# Patient Record
Sex: FEMALE | Race: WHITE | NOT HISPANIC OR LATINO | Employment: FULL TIME | URBAN - METROPOLITAN AREA
[De-identification: names, ages, dates, MRNs, and addresses within clinical notes are randomized per-mention and may not be internally consistent; named-entity substitution may affect disease eponyms.]

---

## 2017-03-06 ENCOUNTER — APPOINTMENT (OUTPATIENT)
Dept: RADIOLOGY | Facility: IMAGING CENTER | Age: 22
End: 2017-03-06
Attending: PHYSICIAN ASSISTANT
Payer: COMMERCIAL

## 2017-03-06 ENCOUNTER — OFFICE VISIT (OUTPATIENT)
Dept: URGENT CARE | Facility: PHYSICIAN GROUP | Age: 22
End: 2017-03-06
Payer: COMMERCIAL

## 2017-03-06 VITALS
TEMPERATURE: 102.9 F | SYSTOLIC BLOOD PRESSURE: 114 MMHG | RESPIRATION RATE: 15 BRPM | DIASTOLIC BLOOD PRESSURE: 70 MMHG | OXYGEN SATURATION: 94 % | HEART RATE: 113 BPM

## 2017-03-06 DIAGNOSIS — J18.9 LINGULAR PNEUMONIA: ICD-10-CM

## 2017-03-06 DIAGNOSIS — R50.9 FEVER, UNSPECIFIED FEVER CAUSE: ICD-10-CM

## 2017-03-06 LAB
FLUAV+FLUBV AG SPEC QL IA: NEGATIVE
INT CON NEG: NEGATIVE
INT CON POS: POSITIVE

## 2017-03-06 PROCEDURE — 99214 OFFICE O/P EST MOD 30 MIN: CPT | Performed by: PHYSICIAN ASSISTANT

## 2017-03-06 PROCEDURE — 87804 INFLUENZA ASSAY W/OPTIC: CPT | Performed by: PHYSICIAN ASSISTANT

## 2017-03-06 PROCEDURE — 71020 DX-CHEST-2 VIEWS: CPT | Mod: TC | Performed by: PHYSICIAN ASSISTANT

## 2017-03-06 RX ORDER — DOXYCYCLINE HYCLATE 100 MG
100 TABLET ORAL 2 TIMES DAILY
Qty: 20 TAB | Refills: 0 | Status: ON HOLD | OUTPATIENT
Start: 2017-03-06 | End: 2017-03-31

## 2017-03-06 RX ORDER — ACETAMINOPHEN 500 MG
1000 TABLET ORAL ONCE
OUTPATIENT
Start: 2017-03-06 | End: 2017-03-07

## 2017-03-06 RX ORDER — CODEINE PHOSPHATE AND GUAIFENESIN 10; 100 MG/5ML; MG/5ML
SOLUTION ORAL
Qty: 100 ML | Refills: 0 | Status: ON HOLD | OUTPATIENT
Start: 2017-03-06 | End: 2017-03-31

## 2017-03-06 NOTE — MR AVS SNAPSHOT
Dorcas Dave   3/6/2017 8:10 AM   Office Visit   MRN: [id number]    Department:  [facility]   Dept Phone:  [phone]    Description:  Female : 1995   Provider:  Kanchan Ragsdale PA-C           Reason for Visit     Cough x 4 days. Headache, one episode of vomiting.      Allergies as of 3/6/2017     Allergen Noted Reactions    Nkda [No Known Drug Allergy] 2008         You were diagnosed with     Lingular pneumonia   [[id number]]         Vital Signs     Blood Pressure Pulse Temperature Respirations Oxygen Saturation Last Menstrual Period    114/70 mmHg 113 39.4 °C (102.9 °F) 15 94% 2017    Breastfeeding? Smoking Status                No Passive Smoke Exposure - Never Smoker          Basic Information     Date Of Birth Sex Race Ethnicity Preferred Language    1995 Female White Non- English      Your appointments     Mar 15, 2017 12:45 PM   Scheduled Pre Admission with PREADMIT 1   [facility] TESTS [facility] (--)    [address] [place] [id number] [phone]              Health Maintenance        Date Due Completion Dates    IMM HEP B VACCINE (1 of 3 - Primary Series) 1995 ---    IMM HEP A VACCINE (1 of 2 - Standard Series) 1996 ---    IMM HPV VACCINE (1 of 3 - Female 3 Dose Series) 2006 ---    IMM VARICELLA (CHICKENPOX) VACCINE (1 of 2 - 2 Dose Adolescent Series) 2008 ---    IMM DTaP/Tdap/Td Vaccine (1 - Tdap) 2014 ---    PAP SMEAR 2016 ---    IMM INFLUENZA (1) 2016 ---            Results     POCT Influenza A/B      Component    Rapid Influenza A-B    Negative    Internal Control Positive    Positive    Internal Control Negative    Negative                        Current Immunizations     No immunizations on file.      Below and/or attached are the medications your provider expects you to take. Review all of your home medications and newly ordered medications with your provider and/or pharmacist. Follow medication instructions as  directed by your provider and/or pharmacist. Please keep your medication list with you and share with your provider. Update the information when medications are discontinued, doses are changed, or new medications (including over-the-counter products) are added; and carry medication information at all times in the event of emergency situations     Allergies:  NKDA - (reactions not documented)               Medications  Valid as of: March 06, 2017 - 11:00 AM    Generic Name Brand Name Tablet Size Instructions for use    Azithromycin (Tab) ZITHROMAX 250 MG 2 tabs by mouth day 1, 1 tab by mouth days 2-5        Doxycycline Hyclate (Tab) VIBRAMYCIN 100 MG Take 1 Tab by mouth 2 times a day.        Guaifenesin-Codeine (Solution) ROBITUSSIN -10 mg/5mL 1-2 teaspoons at bedtime prn cough. No driving.        Ibuprofen (Tab) MOTRIN 600 MG Take 1 Tab by mouth every 6 hours as needed for Mild Pain.        Phenylephrine-Pheniramine-DM   Take  by mouth.        .                 Medicines prescribed today were sent to:     [id number] - [place] - [address]    [address] [place] [place] [address]    Phone: [phone] Fax: [phone]    Open 24 Hours?: No      Medication refill instructions:       If your prescription bottle indicates you have medication refills left, it is not necessary to call your provider’s office. Please contact your pharmacy and they will refill your medication.    If your prescription bottle indicates you do not have any refills left, you may request refills at any time through one of the following ways: The online [employer] system (except Urgent Care), by calling your provider’s office, or by asking your pharmacy to contact your provider’s office with a refill request. Medication refills are processed only during regular business hours and may not be available until the next business day. Your provider may request additional information or to have a follow-up visit with you prior to refilling your  medication.   *Please Note: Medication refills are assigned a new Rx number when refilled electronically. Your pharmacy may indicate that no refills were authorized even though a new prescription for the same medication is available at the pharmacy. Please request the medicine by name with the pharmacy before contacting your provider for a refill.        Your To Do List     Future Labs/Procedures Complete By Expires    DX-CHEST-2 VIEWS  As directed 3/6/2018      Instructions    Pneumonia, Adult  Pneumonia is an infection of the lungs.   CAUSES  Pneumonia may be caused by bacteria or a virus. Usually, the infection is caused by breathing in droplets from an infected person's cough or sneeze.   SYMPTOMS   Symptoms of pneumonia include:  · Cough.  · Fever.  · Chest pain.  · Rapid breathing.  · Shortness of breath.  · Shaking chills.  · Mucus production.  DIAGNOSIS   If you have the common symptoms of pneumonia, often your health care provider will confirm the diagnosis with a chest X-ray. The X-ray will show an abnormality in the lung if you have pneumonia. Other tests may be done on your blood, urine, or mucus (sputum) to find the specific cause of your pneumonia. A blood gas test or pulse oximetry test may be needed to check how well your lungs are working.  TREATMENT   Your treatment will depend on whether your pneumonia is caused by bacteria or a virus.   · Bacterial pneumonia is treated with antibiotic medicine.  · Pneumonia that is caused by the influenza virus may be treated with an antiviral medicine.  · Pneumonia that is caused by a virus other than influenza will not respond to antibiotic medicine. This type of pneumonia will have to run its course.   HOME CARE INSTRUCTIONS   · Cough suppressants may be used if you are losing too much rest from coughing at night. However, you should try to avoid taking cough suppresants. This is because coughing helps to remove mucus from your lungs.  · Sleep in a  semi-upright position at night. Try sleeping in a reclining chair, or place a few pillows under your head.  · Try using a cold steam vaporizer or humidifier in your home or bedroom. This may help loosen your mucus.  · If you were prescribed an antibiotic medicine, finish all of it even if you start to feel better.  · If you were prescribed an expectorant, take it as directed by your health care provider. This medicine loosens the mucus so you can cough it up.  · Take medicines only as directed by your health care provider.  · Do not smoke. If you are a smoker and continue to smoke, your cough may last several weeks after your pneumonia has cleared.  · Get rest when you feel tired, or as needed.  PREVENTION  A pneumococcal shot (vaccine) is available to prevent a common bacterial cause of pneumonia. This is usually suggested for:  · People over 65 years old.  · People on chemotherapy.  · People with chronic lung problems, such as bronchitis or emphysema.  · People with immune system problems.  If you are over 65 years old or have a high risk condition, you may receive the pneumococcal vaccine if you have not received it before. In some countries, a routine influenza vaccine is also recommended. This vaccine can help prevent some cases of pneumonia. You may be offered the influenza vaccine as part of your care.  If you are a smoker, it is time to quit in order to prevent pneumonia in the future. You may receive instructions on how to stop smoking. Your health care provider can provide medicines and counseling to help you quit.  SEEK MEDICAL CARE IF:  · You have a fever.  · You cannot control your cough with suppressants at night, and you keep losing sleep.  SEEK IMMEDIATE MEDICAL CARE IF:   · You have worsening shortness of breath.  · You have increased chest pain.  · Your sickness becomes worse, especially if you are an older adult or have a weakened immune system.  · You cough up blood.  · You have pain that is  getting worse or is not controlled with medicines.  · Your symptoms are getting worse rather than better.     This information is not intended to replace advice given to you by your health care provider. Make sure you discuss any questions you have with your health care provider.     Document Released: 12/18/2006 Document Revised: 01/08/2016 Document Reviewed: 04/13/2016  [employer] Interactive Patient Education ©2016 [employer]            MyChart Status: Patient Declined

## 2017-03-06 NOTE — Clinical Note
March 6, 2017         Patient: Dorcas Dave   YOB: 1995   Date of Visit: 3/6/2017           To Whom it May Concern:    Dorcas Dave was seen in my clinic on 3/6/2017. I recommend she be off through Wednesday to recover.     If you have any questions or concerns, please don't hesitate to call.        Sincerely,           Kanchan Ragsdale PA-C  Electronically Signed

## 2017-03-06 NOTE — PATIENT INSTRUCTIONS
Pneumonia, Adult  Pneumonia is an infection of the lungs.   CAUSES  Pneumonia may be caused by bacteria or a virus. Usually, the infection is caused by breathing in droplets from an infected person's cough or sneeze.   SYMPTOMS   Symptoms of pneumonia include:  · Cough.  · Fever.  · Chest pain.  · Rapid breathing.  · Shortness of breath.  · Shaking chills.  · Mucus production.  DIAGNOSIS   If you have the common symptoms of pneumonia, often your health care provider will confirm the diagnosis with a chest X-ray. The X-ray will show an abnormality in the lung if you have pneumonia. Other tests may be done on your blood, urine, or mucus (sputum) to find the specific cause of your pneumonia. A blood gas test or pulse oximetry test may be needed to check how well your lungs are working.  TREATMENT   Your treatment will depend on whether your pneumonia is caused by bacteria or a virus.   · Bacterial pneumonia is treated with antibiotic medicine.  · Pneumonia that is caused by the influenza virus may be treated with an antiviral medicine.  · Pneumonia that is caused by a virus other than influenza will not respond to antibiotic medicine. This type of pneumonia will have to run its course.   HOME CARE INSTRUCTIONS   · Cough suppressants may be used if you are losing too much rest from coughing at night. However, you should try to avoid taking cough suppresants. This is because coughing helps to remove mucus from your lungs.  · Sleep in a semi-upright position at night. Try sleeping in a reclining chair, or place a few pillows under your head.  · Try using a cold steam vaporizer or humidifier in your home or bedroom. This may help loosen your mucus.  · If you were prescribed an antibiotic medicine, finish all of it even if you start to feel better.  · If you were prescribed an expectorant, take it as directed by your health care provider. This medicine loosens the mucus so you can cough it up.  · Take medicines only as  directed by your health care provider.  · Do not smoke. If you are a smoker and continue to smoke, your cough may last several weeks after your pneumonia has cleared.  · Get rest when you feel tired, or as needed.  PREVENTION  A pneumococcal shot (vaccine) is available to prevent a common bacterial cause of pneumonia. This is usually suggested for:  · People over 65 years old.  · People on chemotherapy.  · People with chronic lung problems, such as bronchitis or emphysema.  · People with immune system problems.  If you are over 65 years old or have a high risk condition, you may receive the pneumococcal vaccine if you have not received it before. In some countries, a routine influenza vaccine is also recommended. This vaccine can help prevent some cases of pneumonia. You may be offered the influenza vaccine as part of your care.  If you are a smoker, it is time to quit in order to prevent pneumonia in the future. You may receive instructions on how to stop smoking. Your health care provider can provide medicines and counseling to help you quit.  SEEK MEDICAL CARE IF:  · You have a fever.  · You cannot control your cough with suppressants at night, and you keep losing sleep.  SEEK IMMEDIATE MEDICAL CARE IF:   · You have worsening shortness of breath.  · You have increased chest pain.  · Your sickness becomes worse, especially if you are an older adult or have a weakened immune system.  · You cough up blood.  · You have pain that is getting worse or is not controlled with medicines.  · Your symptoms are getting worse rather than better.     This information is not intended to replace advice given to you by your health care provider. Make sure you discuss any questions you have with your health care provider.     Document Released: 12/18/2006 Document Revised: 01/08/2016 Document Reviewed: 04/13/2016  [employer] Interactive Patient Education ©2016 [employer]

## 2017-03-06 NOTE — PROGRESS NOTES
Chief Complaint   Patient presents with   • Cough     x 4 days. Headache, one episode of vomiting.       HISTORY OF PRESENT ILLNESS: Patient is a 22 y.o. female who presents today for 4 days of feeling unwell.   Symptoms started late Thursday, early Friday, a little over 72 hours ago. She states she started coughing, headache with coughing. She has started feeling lightheaded. She threw up last night x 1.     She has not been checking temp at home but has been feeling really hot.  She has been taking Tylenol and Theraflu, with minimal relief.     The cough is pretty dry and is sometimes keeping her up at night.   No chest pain.  Feeling SOB intermittently.  No hx of asthma or smoking.     There are no active problems to display for this patient.      Allergies:Nkda    Current Outpatient Prescriptions Ordered in [facility]   Medication Sig Dispense Refill   • Phenylephrine-Pheniramine-DM (THERAFLU COLD & COUGH PO) Take  by mouth.     • azithromycin (ZITHROMAX) 250 MG Tab 2 tabs by mouth day 1, 1 tab by mouth days 2-5 6 Tab 0   • ibuprofen (MOTRIN) 600 MG TABS Take 1 Tab by mouth every 6 hours as needed for Mild Pain. 20 Tab 0     No current Epic-ordered facility-administered medications on file.       Past Medical History   Diagnosis Date   • Ovarian cyst 1 month ago     left, [facility]   • IBS (irritable bowel syndrome)        Social History   Substance Use Topics   • Smoking status: Passive Smoke Exposure - Never Smoker   • Smokeless tobacco: Not on file      Comment: exposed to 1 parents smoke   • Alcohol Use: No       No family status information on file.   No family history on file.  No pertinent FH.        ROS:  Review of Systems   Constitutional: SEE HPI  HENT:SEE HPI  Eyes: Negative for blurred vision.   Respiratory: SEE HPI  Cardiovascular: Negative for chest pain, palpitations, orthopnea and leg swelling.   Gastrointestinal: Negative for heartburn, nausea, vomiting and abdominal pain.   All other systems reviewed and  are negative.       Exam:  Blood pressure 114/70, pulse 113, temperature 39.4 °C (102.9 °F), resp. rate 15, last menstrual period 02/27/2017, SpO2 94 %, not currently breastfeeding.  General:  Well nourished, well developed female in NAD, mildly unwell appearing.   Eyes: PERRLA, EOM within normal limits, no conjunctival injection, no scleral icterus, visual fields and acuity grossly intact.  Ears: Normal shape and symmetry, no tenderness, no discharge. External canals are without any significant edema or erythema. Tympanic membranes are without any inflammation, no effusion. Gross auditory acuity is intact  Nose: Symmetrical, sinuses without tenderness, no discharge.   Mouth: reasonable hygiene, no erythema exudates or tonsillar enlargement.  Neck: no masses, range of motion within normal limits, no tracheal deviation. No lymphadenopathy  Pulmonary: Normal respiratory effort, no wheezes, faint left sided crackles, no rhonchi.   Cardiovascular: regular rate and rhythm without murmurs, rubs, or gallops.  Skin: No visible rashes or lesion. Warm, pink, dry.   Extremities: no clubbing, cyanosis, or edema.  Neuro: A&O x 3. Speech normal/clear.  Normal gait.     RAD    Impression        Consolidative lingular pneumonia. Interval follow-up recommended.         Reading Provider Reading Date     Esvin Neely M.D. Mar 6, 2017         Assessment/Plan:  1. Lingular pneumonia  POCT Influenza A/B    acetaminophen (TYLENOL) tablet 1,000 mg    DX-CHEST-2 VIEWS    doxycycline (VIBRAMYCIN) 100 MG Tab    guaifenesin-codeine (CHERATUSSIN AC) Solution oral solution     -Lingular pneumonia as above.  O2 sats acceptable at 94%.       -no recent antibiotics    -fluids emphasized. Alternating Tylenol/Motrin prn pain/inflammation/fever  -Work note provided.     -codeine cough syrup as above.  Emphasized drowsy and no driving on this medicine.  Patient expressed understanding of this.   -RTC precautions discussed such as worsening despite abx,  worsening fevers, difficulty breathing, etc.   ER precautions discussed in detail.      Supportive care, differential diagnoses, and indications for immediate follow-up discussed with patient.   Pathogenesis of diagnosis discussed including typical length and natural progression.   Instructed to return to clinic or nearest emergency department for any change in condition, further concerns, or worsening of symptoms.  Patient states understanding of the plan of care and discharge instructions.  Instructed to make an appointment, for follow up, with their primary care provider.      Kanchan Ragsdale PA-C

## 2017-03-15 DIAGNOSIS — Z01.810 PRE-OPERATIVE CARDIOVASCULAR EXAMINATION: ICD-10-CM

## 2017-03-15 DIAGNOSIS — Z01.812 PRE-PROCEDURAL LABORATORY EXAMINATION: ICD-10-CM

## 2017-03-15 LAB
ANION GAP SERPL CALC-SCNC: 5 MMOL/L (ref 0–11.9)
BUN SERPL-MCNC: 13 MG/DL (ref 8–22)
CALCIUM SERPL-MCNC: 9.3 MG/DL (ref 8.5–10.5)
CHLORIDE SERPL-SCNC: 106 MMOL/L (ref 96–112)
CO2 SERPL-SCNC: 27 MMOL/L (ref 20–33)
CREAT SERPL-MCNC: 0.7 MG/DL (ref 0.5–1.4)
EKG IMPRESSION: NORMAL
ERYTHROCYTE [DISTWIDTH] IN BLOOD BY AUTOMATED COUNT: 42.4 FL (ref 35.9–50)
GFR SERPL CREATININE-BSD FRML MDRD: >60 ML/MIN/1.73 M 2
GLUCOSE SERPL-MCNC: 94 MG/DL (ref 65–99)
HCT VFR BLD AUTO: 39.8 % (ref 37–47)
HGB BLD-MCNC: 12.8 G/DL (ref 12–16)
MCH RBC QN AUTO: 27.3 PG (ref 27–33)
MCHC RBC AUTO-ENTMCNC: 32.2 G/DL (ref 33.6–35)
MCV RBC AUTO: 84.9 FL (ref 81.4–97.8)
PLATELET # BLD AUTO: 323 K/UL (ref 164–446)
PMV BLD AUTO: 9.1 FL (ref 9–12.9)
POTASSIUM SERPL-SCNC: 4.3 MMOL/L (ref 3.6–5.5)
RBC # BLD AUTO: 4.69 M/UL (ref 4.2–5.4)
SODIUM SERPL-SCNC: 138 MMOL/L (ref 135–145)
WBC # BLD AUTO: 6.7 K/UL (ref 4.8–10.8)

## 2017-03-15 PROCEDURE — 36415 COLL VENOUS BLD VENIPUNCTURE: CPT

## 2017-03-15 PROCEDURE — 85027 COMPLETE CBC AUTOMATED: CPT

## 2017-03-15 PROCEDURE — 80048 BASIC METABOLIC PNL TOTAL CA: CPT

## 2017-03-31 ENCOUNTER — HOSPITAL ENCOUNTER (OUTPATIENT)
Facility: MEDICAL CENTER | Age: 22
End: 2017-03-31
Attending: SURGERY | Admitting: SURGERY
Payer: COMMERCIAL

## 2017-03-31 VITALS
HEIGHT: 66 IN | SYSTOLIC BLOOD PRESSURE: 108 MMHG | BODY MASS INDEX: 43.33 KG/M2 | TEMPERATURE: 98.6 F | RESPIRATION RATE: 18 BRPM | WEIGHT: 269.62 LBS | OXYGEN SATURATION: 96 % | DIASTOLIC BLOOD PRESSURE: 60 MMHG | HEART RATE: 67 BPM

## 2017-03-31 PROBLEM — K82.8 OTHER SPECIFIED DISORDER OF GALLBLADDER: Status: ACTIVE | Noted: 2017-03-31

## 2017-03-31 LAB
HCG UR QL: NEGATIVE
SP GR UR REFRACTOMETRY: 1.03

## 2017-03-31 PROCEDURE — 88304 TISSUE EXAM BY PATHOLOGIST: CPT

## 2017-03-31 PROCEDURE — 160036 HCHG PACU - EA ADDL 30 MINS PHASE I: Performed by: SURGERY

## 2017-03-31 PROCEDURE — 700102 HCHG RX REV CODE 250 W/ 637 OVERRIDE(OP)

## 2017-03-31 PROCEDURE — 500697 HCHG HEMOCLIP, LARGE (ORANGE): Performed by: SURGERY

## 2017-03-31 PROCEDURE — 160035 HCHG PACU - 1ST 60 MINS PHASE I: Performed by: SURGERY

## 2017-03-31 PROCEDURE — 502240 HCHG MISC OR SUPPLY RC 0272: Performed by: SURGERY

## 2017-03-31 PROCEDURE — 110371 HCHG SHELL REV 272: Performed by: SURGERY

## 2017-03-31 PROCEDURE — 160048 HCHG OR STATISTICAL LEVEL 1-5: Performed by: SURGERY

## 2017-03-31 PROCEDURE — 160028 HCHG SURGERY MINUTES - 1ST 30 MINS LEVEL 3: Performed by: SURGERY

## 2017-03-31 PROCEDURE — 500868 HCHG NEEDLE, SURGI(VARES): Performed by: SURGERY

## 2017-03-31 PROCEDURE — 160009 HCHG ANES TIME/MIN: Performed by: SURGERY

## 2017-03-31 PROCEDURE — A9270 NON-COVERED ITEM OR SERVICE: HCPCS

## 2017-03-31 PROCEDURE — A4606 OXYGEN PROBE USED W OXIMETER: HCPCS | Performed by: SURGERY

## 2017-03-31 PROCEDURE — 501583 HCHG TROCAR, THRD CAN&SEAL 5X100: Performed by: SURGERY

## 2017-03-31 PROCEDURE — 700111 HCHG RX REV CODE 636 W/ 250 OVERRIDE (IP)

## 2017-03-31 PROCEDURE — 160046 HCHG PACU - 1ST 60 MINS PHASE II: Performed by: SURGERY

## 2017-03-31 PROCEDURE — 160002 HCHG RECOVERY MINUTES (STAT): Performed by: SURGERY

## 2017-03-31 PROCEDURE — 160025 RECOVERY II MINUTES (STATS): Performed by: SURGERY

## 2017-03-31 PROCEDURE — 501577 HCHG TROCAR, STEP 11MM: Performed by: SURGERY

## 2017-03-31 PROCEDURE — 501838 HCHG SUTURE GENERAL: Performed by: SURGERY

## 2017-03-31 PROCEDURE — 501570 HCHG TROCAR, SEPARATOR: Performed by: SURGERY

## 2017-03-31 PROCEDURE — 110382 HCHG SHELL REV 271: Performed by: SURGERY

## 2017-03-31 PROCEDURE — 160039 HCHG SURGERY MINUTES - EA ADDL 1 MIN LEVEL 3: Performed by: SURGERY

## 2017-03-31 PROCEDURE — 501399 HCHG SPECIMAN BAG, ENDO CATC: Performed by: SURGERY

## 2017-03-31 PROCEDURE — 81025 URINE PREGNANCY TEST: CPT

## 2017-03-31 PROCEDURE — 160047 HCHG PACU  - EA ADDL 30 MINS PHASE II: Performed by: SURGERY

## 2017-03-31 PROCEDURE — 700101 HCHG RX REV CODE 250

## 2017-03-31 RX ORDER — OXYCODONE HYDROCHLORIDE AND ACETAMINOPHEN 5; 325 MG/1; MG/1
1-2 TABLET ORAL EVERY 4 HOURS PRN
Status: DISCONTINUED | OUTPATIENT
Start: 2017-03-31 | End: 2017-03-31 | Stop reason: HOSPADM

## 2017-03-31 RX ORDER — KETOROLAC TROMETHAMINE 30 MG/ML
INJECTION, SOLUTION INTRAMUSCULAR; INTRAVENOUS
Status: COMPLETED
Start: 2017-03-31 | End: 2017-03-31

## 2017-03-31 RX ORDER — BUPIVACAINE HYDROCHLORIDE AND EPINEPHRINE 5; 5 MG/ML; UG/ML
INJECTION, SOLUTION EPIDURAL; INTRACAUDAL; PERINEURAL
Status: DISCONTINUED | OUTPATIENT
Start: 2017-03-31 | End: 2017-03-31 | Stop reason: HOSPADM

## 2017-03-31 RX ORDER — KETOROLAC TROMETHAMINE 30 MG/ML
30 INJECTION, SOLUTION INTRAMUSCULAR; INTRAVENOUS EVERY 6 HOURS
Status: DISCONTINUED | OUTPATIENT
Start: 2017-03-31 | End: 2017-03-31 | Stop reason: HOSPADM

## 2017-03-31 RX ORDER — ONDANSETRON 2 MG/ML
4 INJECTION INTRAMUSCULAR; INTRAVENOUS
Status: DISCONTINUED | OUTPATIENT
Start: 2017-03-31 | End: 2017-03-31 | Stop reason: HOSPADM

## 2017-03-31 RX ORDER — MORPHINE SULFATE 4 MG/ML
1-4 INJECTION, SOLUTION INTRAMUSCULAR; INTRAVENOUS
Status: DISCONTINUED | OUTPATIENT
Start: 2017-03-31 | End: 2017-03-31 | Stop reason: HOSPADM

## 2017-03-31 RX ORDER — OXYCODONE HYDROCHLORIDE AND ACETAMINOPHEN 5; 325 MG/1; MG/1
TABLET ORAL
Status: COMPLETED
Start: 2017-03-31 | End: 2017-03-31

## 2017-03-31 RX ORDER — MORPHINE SULFATE 4 MG/ML
INJECTION, SOLUTION INTRAMUSCULAR; INTRAVENOUS
Status: COMPLETED
Start: 2017-03-31 | End: 2017-03-31

## 2017-03-31 RX ORDER — MEPERIDINE HYDROCHLORIDE 25 MG/ML
INJECTION INTRAMUSCULAR; INTRAVENOUS; SUBCUTANEOUS
Status: COMPLETED
Start: 2017-03-31 | End: 2017-03-31

## 2017-03-31 RX ADMIN — KETOROLAC TROMETHAMINE 30 MG: 30 INJECTION, SOLUTION INTRAMUSCULAR; INTRAVENOUS at 10:07

## 2017-03-31 RX ADMIN — OXYCODONE AND ACETAMINOPHEN 2 TABLET: 5; 325 TABLET ORAL at 10:20

## 2017-03-31 RX ADMIN — FENTANYL CITRATE 50 MCG: 50 INJECTION, SOLUTION INTRAMUSCULAR; INTRAVENOUS at 09:53

## 2017-03-31 RX ADMIN — MEPERIDINE HYDROCHLORIDE 12.5 MG: 25 INJECTION INTRAMUSCULAR; INTRAVENOUS; SUBCUTANEOUS at 10:05

## 2017-03-31 RX ADMIN — FENTANYL CITRATE 50 MCG: 50 INJECTION, SOLUTION INTRAMUSCULAR; INTRAVENOUS at 10:14

## 2017-03-31 RX ADMIN — MEPERIDINE HYDROCHLORIDE 12.5 MG: 25 INJECTION INTRAMUSCULAR; INTRAVENOUS; SUBCUTANEOUS at 10:00

## 2017-03-31 ASSESSMENT — PAIN SCALES - GENERAL
PAINLEVEL_OUTOF10: 4
PAINLEVEL_OUTOF10: 5
PAINLEVEL_OUTOF10: 4
PAINLEVEL_OUTOF10: 0
PAINLEVEL_OUTOF10: 5
PAINLEVEL_OUTOF10: 5

## 2017-03-31 NOTE — OR SURGEON
Immediate Post-Operative Note      PreOp Diagnosis: cc    PostOp Diagnosis: smae    Procedure(s):  ELFEGO BY LAPAROSCOPY - Wound Class: Clean Contaminated    Surgeon(s):  NAINA Burton M.D.    Anesthesiologist/Type of Anesthesia:  Anesthesiologist: Bubba Solis M.D./General    Surgical Staff:  Circulator: Vijaya Juarez R.N.  Scrub Person: Cara Bruner; Fabrizio Baugh    Specimen: 1    Estimated Blood Loss: 5    Findings: same    Complications: 0        3/31/2017 9:40 AM Srinivasa Valentine

## 2017-03-31 NOTE — IP AVS SNAPSHOT
" Home Care Instructions                                                                                                                Name:Dorcas Dave  Medical Record Number:[id number]  CSN: [id number]    YOB: 1995   Age: 22 y.o.  Sex: female  HT:1.676 m (5' 6\") WT: 122.3 kg (269 lb 10 oz)          Admit Date: 3/31/2017     Discharge Date:   Today's Date: 3/31/2017  Attending Doctor:  Srinivasa Valentine M.D.                  Allergies:  Nkda                Discharge Instructions         ACTIVITY: Rest and take it easy for the first 24 hours.  A responsible adult is recommended to remain with you during that time.  It is normal to feel sleepy.  We encourage you to not do anything that requires balance, judgment or coordination.    MILD FLU-LIKE SYMPTOMS ARE NORMAL. YOU MAY EXPERIENCE GENERALIZED MUSCLE ACHES, THROAT IRRITATION, HEADACHE AND/OR SOME NAUSEA.    FOR 24 HOURS DO NOT:  Drive, operate machinery or run household appliances.  Drink beer or alcoholic beverages.   Make important decisions or sign legal documents.      SPECIAL INSTRUCTIONS:   Laparoscopic Cholecystectomy, Care After  Refer to this sheet in the next few weeks. These instructions provide you with information on caring for yourself after your procedure. Your health care provider may also give you more specific instructions. Your treatment has been planned according to current medical practices, but problems sometimes occur. Call your health care provider if you have any problems or questions after your procedure.  WHAT TO EXPECT AFTER THE PROCEDURE  After your procedure, it is typical to have the following:  · Pain at your incision sites. You will be given pain medicines to control the pain.  · Mild nausea or vomiting. This should improve after the first 24 hours.  · Bloating and possibly shoulder pain from the gas used during the procedure. This will improve after the first 24 hours.  HOME CARE INSTRUCTIONS   · Change bandages " (dressings) as directed by your health care provider.  · Keep the wound dry and clean. You may wash the wound gently with soap and water. Gently blot or dab the area dry.  · Do not take baths or use swimming pools or hot tubs for 2 weeks or until your health care provider approves.  · Only take over-the-counter or prescription medicines as directed by your health care provider.  · Continue your normal diet as directed by your health care provider.  · Do not lift anything heavier than 10 pounds (4.5 kg) until your health care provider approves.  · Do not play contact sports for 1 week or until your health care provider approves.  SEEK MEDICAL CARE IF:   · You have redness, swelling, or increasing pain in the wound.  · You notice yellowish-white fluid (pus) coming from the wound.  · You have drainage from the wound that lasts longer than 1 day.  · You notice a bad smell coming from the wound or dressing.  · Your surgical cuts (incisions) break open.  SEEK IMMEDIATE MEDICAL CARE IF:   · You develop a rash.  · You have difficulty breathing.  · You have chest pain.  · You have a fever.  · You have increasing pain in the shoulders (shoulder strap areas).  · You have dizzy episodes or faint while standing.  · You have severe abdominal pain.  · You feel sick to your stomach (nauseous) or throw up (vomit) and this lasts for more than 1 day.     This information is not intended to replace advice given to you by your health care provider. Make sure you discuss any questions you have with your health care provider.     Document Released: 12/18/2006 Document Revised: 10/08/2014 Document Reviewed: 07/30/2014  [employer] Interactive Patient Education ©2016 [employer]      DIET: To avoid nausea, slowly advance diet as tolerated, avoiding spicy or greasy foods for the first day.  Add more substantial food to your diet according to your physician's instructions.  Babies can be fed formula or breast milk as soon as they are hungry.   INCREASE FLUIDS AND FIBER TO AVOID CONSTIPATION.    SURGICAL DRESSING/BATHING: Call MD with any questions.     FOLLOW-UP APPOINTMENT:  A follow-up appointment should be arranged with your doctor in 1-2 weeks; call to schedule.    You should CALL YOUR PHYSICIAN if you develop:  Fever greater than 101 degrees F.  Pain not relieved by medication, or persistent nausea or vomiting.  Excessive bleeding (blood soaking through dressing) or unexpected drainage from the wound.  Extreme redness or swelling around the incision site, drainage of pus or foul smelling drainage.  Inability to urinate or empty your bladder within 8 hours.  Problems with breathing or chest pain.    You should call 911 if you develop problems with breathing or chest pain.  If you are unable to contact your doctor or surgical center, you should go to the nearest emergency room or urgent care center.  Physician's telephone #: [phone]    If any questions arise, call your doctor.  If your doctor is not available, please feel free to call the Surgical Center at [phone].  The Center is open Monday through Friday from 7AM to 7PM.  You can also call the [employer] HOTLINE open 24 hours/day, 7 days/week and speak to a nurse at [phone], or toll free at [phone].    A registered nurse may call you a few days after your surgery to see how you are doing after your procedure.    MEDICATIONS: Resume taking daily medication.  Take prescribed pain medication with food.  If no medication is prescribed, you may take non-aspirin pain medication if needed.  PAIN MEDICATION CAN BE VERY CONSTIPATING.  Take a stool softener or laxative such as senokot, pericolace, or milk of magnesia if needed.    Prescription given for Oxycodone.  Last pain medication given at 10:20 am.    If your physician has prescribed pain medication that includes Acetaminophen (Tylenol), do not take additional Acetaminophen (Tylenol) while taking the prescribed  medication.    Depression / Suicide Risk    As you are discharged from this [facility] facility, it is important to learn how to keep safe from harming yourself.    Recognize the warning signs:  · Abrupt changes in personality, positive or negative- including increase in energy   · Giving away possessions  · Change in eating patterns- significant weight changes-  positive or negative  · Change in sleeping patterns- unable to sleep or sleeping all the time   · Unwillingness or inability to communicate  · Depression  · Unusual sadness, discouragement and loneliness  · Talk of wanting to die  · Neglect of personal appearance   · Rebelliousness- reckless behavior  · Withdrawal from people/activities they love  · Confusion- inability to concentrate     If you or a loved one observes any of these behaviors or has concerns about self-harm, here's what you can do:  · Talk about it- your feelings and reasons for harming yourself  · Remove any means that you might use to hurt yourself (examples: pills, rope, extension cords, firearm)  · Get professional help from the community (Mental Health, Substance Abuse, psychological counseling)  · Do not be alone:Call your Safe Contact- someone whom you trust who will be there for you.  · Call your local CRISIS HOTLINE 978-8102 or 345-886-7459  · Call your local Children's Mobile Crisis Response Team [place] (042) 831-6318 or www.[employer] (MyBrandz)  · Call the toll free National Suicide Prevention Hotlines   · National Suicide Prevention Lifeline 198-918-GUUQ (5873)  · National Hope Line Network 800-SUICIDE (445-3396)       Medication List      Notice     You have not been prescribed any medications.            Medication Information     Above and/or attached are the medications your physician expects you to take upon discharge. Review all of your home medications and newly ordered medications with your doctor and/or pharmacist. Follow medication instructions as directed by your  doctor and/or pharmacist. Please keep your medication list with you and share with your physician. Update the information when medications are discontinued, doses are changed, or new medications (including over-the-counter products) are added; and carry medication information at all times in the event of emergency situations.        Resources     Quit Smoking / Tobacco Use:    I understand the use of any tobacco products increases my chance of suffering from future heart disease or stroke and could cause other illnesses which may shorten my life. Quitting the use of tobacco products is the single most important thing I can do to improve my health. For further information on smoking / tobacco cessation call a Toll Free Quit Line at 1-213.706.2149 (*National [employer] [place]) or 1-339.570.2753 (American Lung Association) or you can access the web based program at www.lungGoozzy.org.    [place] Tobacco Users Help Line:  (509) 596-2322       Toll Free: 1-948.686.2263  Quit Tobacco Program [facility] (408)478-6019    Crisis Hotline:    [place] Crisis Hotline:  3-064-BNQYJFS or 1-305.525.7332    [place] Crisis Hotline:    1-655.941.9197 or 373-233-3617    Discharge Survey:   Thank you for choosing [facility]. We hope we did everything we could to make your hospital stay a pleasant one. You may be receiving a survey and we would appreciate your time and participation in answering the questions. Your input is very valuable to us in our efforts to improve our service to our patients and their families.            Signatures     My signature on this form indicates that:    1. I acknowledge receipt and understanding of these Home Care Instruction.  2. My questions regarding this information have been answered to my satisfaction.  3. I have formulated a plan with my discharge nurse to obtain my prescribed medications for home.    __________________________________      __________________________________                    Patient Signature                                 Guardian/Responsible Adult Signature      __________________________________                 __________       ________                       Nurse Signature                                               Date                 Time

## 2017-03-31 NOTE — DISCHARGE INSTRUCTIONS
ACTIVITY: Rest and take it easy for the first 24 hours.  A responsible adult is recommended to remain with you during that time.  It is normal to feel sleepy.  We encourage you to not do anything that requires balance, judgment or coordination.    MILD FLU-LIKE SYMPTOMS ARE NORMAL. YOU MAY EXPERIENCE GENERALIZED MUSCLE ACHES, THROAT IRRITATION, HEADACHE AND/OR SOME NAUSEA.    FOR 24 HOURS DO NOT:  Drive, operate machinery or run household appliances.  Drink beer or alcoholic beverages.   Make important decisions or sign legal documents.      SPECIAL INSTRUCTIONS:   Laparoscopic Cholecystectomy, Care After  Refer to this sheet in the next few weeks. These instructions provide you with information on caring for yourself after your procedure. Your health care provider may also give you more specific instructions. Your treatment has been planned according to current medical practices, but problems sometimes occur. Call your health care provider if you have any problems or questions after your procedure.  WHAT TO EXPECT AFTER THE PROCEDURE  After your procedure, it is typical to have the following:  · Pain at your incision sites. You will be given pain medicines to control the pain.  · Mild nausea or vomiting. This should improve after the first 24 hours.  · Bloating and possibly shoulder pain from the gas used during the procedure. This will improve after the first 24 hours.  HOME CARE INSTRUCTIONS   · Change bandages (dressings) as directed by your health care provider.  · Keep the wound dry and clean. You may wash the wound gently with soap and water. Gently blot or dab the area dry.  · Do not take baths or use swimming pools or hot tubs for 2 weeks or until your health care provider approves.  · Only take over-the-counter or prescription medicines as directed by your health care provider.  · Continue your normal diet as directed by your health care provider.  · Do not lift anything heavier than 10 pounds (4.5 kg)  until your health care provider approves.  · Do not play contact sports for 1 week or until your health care provider approves.  SEEK MEDICAL CARE IF:   · You have redness, swelling, or increasing pain in the wound.  · You notice yellowish-white fluid (pus) coming from the wound.  · You have drainage from the wound that lasts longer than 1 day.  · You notice a bad smell coming from the wound or dressing.  · Your surgical cuts (incisions) break open.  SEEK IMMEDIATE MEDICAL CARE IF:   · You develop a rash.  · You have difficulty breathing.  · You have chest pain.  · You have a fever.  · You have increasing pain in the shoulders (shoulder strap areas).  · You have dizzy episodes or faint while standing.  · You have severe abdominal pain.  · You feel sick to your stomach (nauseous) or throw up (vomit) and this lasts for more than 1 day.     This information is not intended to replace advice given to you by your health care provider. Make sure you discuss any questions you have with your health care provider.     Document Released: 12/18/2006 Document Revised: 10/08/2014 Document Reviewed: 07/30/2014  [employer] Interactive Patient Education ©2016 [employer]      DIET: To avoid nausea, slowly advance diet as tolerated, avoiding spicy or greasy foods for the first day.  Add more substantial food to your diet according to your physician's instructions.  Babies can be fed formula or breast milk as soon as they are hungry.  INCREASE FLUIDS AND FIBER TO AVOID CONSTIPATION.    SURGICAL DRESSING/BATHING: Call MD with any questions.     FOLLOW-UP APPOINTMENT:  A follow-up appointment should be arranged with your doctor in 1-2 weeks; call to schedule.    You should CALL YOUR PHYSICIAN if you develop:  Fever greater than 101 degrees F.  Pain not relieved by medication, or persistent nausea or vomiting.  Excessive bleeding (blood soaking through dressing) or unexpected drainage from the wound.  Extreme redness or swelling around  the incision site, drainage of pus or foul smelling drainage.  Inability to urinate or empty your bladder within 8 hours.  Problems with breathing or chest pain.    You should call 911 if you develop problems with breathing or chest pain.  If you are unable to contact your doctor or surgical center, you should go to the nearest emergency room or urgent care center.  Physician's telephone #: [phone]    If any questions arise, call your doctor.  If your doctor is not available, please feel free to call the Surgical Center at [phone].  The Center is open Monday through Friday from 7AM to 7PM.  You can also call the HEALTH HOTLINE open 24 hours/day, 7 days/week and speak to a nurse at [phone], or toll free at [phone].    A registered nurse may call you a few days after your surgery to see how you are doing after your procedure.    MEDICATIONS: Resume taking daily medication.  Take prescribed pain medication with food.  If no medication is prescribed, you may take non-aspirin pain medication if needed.  PAIN MEDICATION CAN BE VERY CONSTIPATING.  Take a stool softener or laxative such as senokot, pericolace, or milk of magnesia if needed.    Prescription given for Oxycodone.  Last pain medication given at 10:20 am.    If your physician has prescribed pain medication that includes Acetaminophen (Tylenol), do not take additional Acetaminophen (Tylenol) while taking the prescribed medication.    Depression / Suicide Risk    As you are discharged from this [facility] facility, it is important to learn how to keep safe from harming yourself.    Recognize the warning signs:  · Abrupt changes in personality, positive or negative- including increase in energy   · Giving away possessions  · Change in eating patterns- significant weight changes-  positive or negative  · Change in sleeping patterns- unable to sleep or sleeping all the time   · Unwillingness or inability to  communicate  · Depression  · Unusual sadness, discouragement and loneliness  · Talk of wanting to die  · Neglect of personal appearance   · Rebelliousness- reckless behavior  · Withdrawal from people/activities they love  · Confusion- inability to concentrate     If you or a loved one observes any of these behaviors or has concerns about self-harm, here's what you can do:  · Talk about it- your feelings and reasons for harming yourself  · Remove any means that you might use to hurt yourself (examples: pills, rope, extension cords, firearm)  · Get professional help from the community (Mental Health, Substance Abuse, psychological counseling)  · Do not be alone:Call your Safe Contact- someone whom you trust who will be there for you.  · Call your local CRISIS HOTLINE 068-6521 or 448-840-1044  · Call your local Children's Mobile Crisis Response Team [place] (926) 129-3077 or www.[employer]  · Call the toll free National Suicide Prevention Hotlines   · National Suicide Prevention Lifeline 753-786-IVRW (6530)  · National Hope Line Network 800-SUICIDE (467-9370)

## 2017-03-31 NOTE — OP REPORT
DATE OF SERVICE:  03/31/2017    PREOPERATIVE DIAGNOSIS:  Chronic cholecystitis.    POSTOPERATIVE DIAGNOSIS:  Chronic cholecystitis.    OPERATION PERFORMED:  Laparoscopic cholecystectomy.    SURGEON:  Srinivasa Valentine MD.    ANESTHESIA:  Dr. Solis.    ASSISTANT:  Merrill Brink MD.    OPERATIVE NOTE:  The patient presents with signs and symptoms of biliary colic   and chronic cholecystitis.  She is felt to be a candidate for laparoscopic   cholecystectomy.  The risks and possible complications of operation were   carefully explained to her in detail.  She understood and accepted these risks   and wished to proceed.  She was taken to the operating room and placed under   anesthesia by Dr. Solis.  With her consent, her abdomen was prepped with   ChloraPrep solution.  Sterile drapes were applied.  Prophylactic antibiotics   had been administered and the patient had sequential stockings applied as   anti-embolism prophylaxis.  A time-out was affected.  A solution of 0.5%   Marcaine with epinephrine was liberally infiltrated in all wounds.  An   infraumbilical cutdown was made.  The fascia was elevated, was pierced with a   Veress needle.  Saline drop test was permissive to proceed with step   pneumoinsufflation.  Once pneumo insufflated, an 11 mm trocar was placed.  The   abdomen was surveyed.  She had a fatty liver.  There were no other   significant findings.  An 11 mm trocar was placed in the epigastrium and a   single 5 mm trocar was placed in the right upper quadrant.  The gallbladder   was grasped and elevated.  The adhesions were taken down using sharp   dissection electrocautery.  The triangle of Calot was dissected.  The cystic   duct was identified, triply clipped and divided and the cystic artery was   identified, doubly clipped and divided.  The gallbladder was taken down in   anterior grade fashion using countertraction electrocautery and delivered via   an Endosac.  The right upper quadrant was  copiously irrigated with sterile   saline.  Hemostasis was assured.  The patient upper abdominal trocars were   removed.  Their sites were hemostatic.  Pneumoperitoneum was collapsed.  The   fascia was closed using 0 Vicryl suture, the skin with running 4-0 Monocryl   subcuticular and Steri-Strips were applied with sterile dressings.  The   patient tolerated the procedure well.  There were no apparent complications   and is anticipated she will be treated on an ambulatory basis.  Estimated   blood loss was 5 mL.  The patient was given a prescription for oxycodone IR 5   mg #40 and Phenergan 25 mg #10, one refill.  She was asked to return to see me   in 1 week _____ that she has uncomplicated recovery.  If she has problems she   must call probably.  She was given careful postoperative care instructions.       ____________________________________     MD DAMEON BOLDEN / ONI    DD:  03/31/2017 09:43:37  DT:  03/31/2017 10:36:16    D#:  [id number]  Job#:  [id number]    cc: SHELTON LOPEZ MD, EBONIE GALE MD

## 2017-03-31 NOTE — IP AVS SNAPSHOT
3/31/2017          Dorcas Dave  [address] [place] [address]    Dear Dorcas:    [facility] wants to ensure your discharge home is safe and you or your loved ones have had all your questions answered regarding your care after you leave the hospital.    You may receive a telephone call within two days of your discharge.  This call is to make certain you understand your discharge instructions as well as ensure we provided you with the best care possible during your stay with us.     The call will only last approximately 3-5 minutes and will be done by a nurse.    Once again, we want to ensure your discharge home is safe and that you have a clear understanding of any next steps in your care.  If you have any questions or concerns, please do not hesitate to contact us, we are here for you.  Thank you for choosing [facility] for your healthcare needs.    Sincerely,    Ian Hernandez    [facility]

## 2017-11-13 ENCOUNTER — HOSPITAL ENCOUNTER (EMERGENCY)
Facility: MEDICAL CENTER | Age: 22
End: 2017-11-14
Attending: EMERGENCY MEDICINE
Payer: COMMERCIAL
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
## 2017-11-13 DIAGNOSIS — S09.90XA CLOSED HEAD INJURY, INITIAL ENCOUNTER: ICD-10-CM

## 2017-11-13 DIAGNOSIS — S00.83XA CONTUSION OF FACE, INITIAL ENCOUNTER: ICD-10-CM

## 2017-11-13 PROCEDURE — 99283 EMERGENCY DEPT VISIT LOW MDM: CPT

## 2017-11-14 VITALS
HEART RATE: 65 BPM | HEIGHT: 66 IN | BODY MASS INDEX: 43.69 KG/M2 | WEIGHT: 271.83 LBS | DIASTOLIC BLOOD PRESSURE: 69 MMHG | OXYGEN SATURATION: 99 % | TEMPERATURE: 97.7 F | RESPIRATION RATE: 18 BRPM | SYSTOLIC BLOOD PRESSURE: 130 MMHG

## 2017-11-14 ASSESSMENT — PAIN SCALES - GENERAL: PAINLEVEL_OUTOF10: 2

## 2017-11-14 NOTE — DISCHARGE INSTRUCTIONS
Facial or Scalp Contusion  A facial or scalp contusion is a deep bruise on the face or head. Injuries to the face and head generally cause a lot of swelling, especially around the eyes. Contusions are the result of an injury that caused bleeding under the skin. The contusion may turn blue, purple, or yellow. Minor injuries will give you a painless contusion, but more severe contusions may stay painful and swollen for a few weeks.   CAUSES   A facial or scalp contusion is caused by a blunt injury or trauma to the face or head area.   SIGNS AND SYMPTOMS   · Swelling of the injured area.    · Discoloration of the injured area.    · Tenderness, soreness, or pain in the injured area.    DIAGNOSIS   The diagnosis can be made by taking a medical history and doing a physical exam. An X-ray exam, CT scan, or MRI may be needed to determine if there are any associated injuries, such as broken bones (fractures).  TREATMENT   Often, the best treatment for a facial or scalp contusion is applying cold compresses to the injured area. Over-the-counter medicines may also be recommended for pain control.   HOME CARE INSTRUCTIONS   · Only take over-the-counter or prescription medicines as directed by your health care provider.    · Apply ice to the injured area.    ¨ Put ice in a plastic bag.    ¨ Place a towel between your skin and the bag.    ¨ Leave the ice on for 20 minutes, 2-3 times a day.    SEEK MEDICAL CARE IF:  · You have bite problems.    · You have pain with chewing.    · You are concerned about facial defects.  SEEK IMMEDIATE MEDICAL CARE IF:  · You have severe pain or a headache that is not relieved by medicine.    · You have unusual sleepiness, confusion, or personality changes.    · You throw up (vomit).    · You have a persistent nosebleed.    · You have double vision or blurred vision.    · You have fluid drainage from your nose or ear.    · You have difficulty walking or using your arms or legs.    MAKE SURE YOU:    · Understand these instructions.  · Will watch your condition.  · Will get help right away if you are not doing well or get worse.     This information is not intended to replace advice given to you by your health care provider. Make sure you discuss any questions you have with your health care provider.     Document Released: 01/25/2006 Document Revised: 01/08/2016 Document Reviewed: 07/31/2014  [employer] Interactive Patient Education ©2016 [employer]vier Inc[employer]      Head Injury, Adult  You have a head injury. Headaches and throwing up (vomiting) are common after a head injury. It should be easy to wake up from sleeping. Sometimes you must stay in the hospital. Most problems happen within the first 24 hours. Side effects may occur up to 7-10 days after the injury.   WHAT ARE THE TYPES OF HEAD INJURIES?  Head injuries can be as minor as a bump. Some head injuries can be more severe. More severe head injuries include:  · A jarring injury to the brain (concussion).  · A bruise of the brain (contusion). This mean there is bleeding in the brain that can cause swelling.  · A cracked skull (skull fracture).  · Bleeding in the brain that collects, clots, and forms a bump (hematoma).  WHEN SHOULD I GET HELP RIGHT AWAY?   · You are confused or sleepy.  · You cannot be woken up.  · You feel sick to your stomach (nauseous) or keep throwing up (vomiting).  · Your dizziness or unsteadiness is getting worse.  · You have very bad, lasting headaches that are not helped by medicine. Take medicines only as told by your doctor.  · You cannot use your arms or legs like normal.  · You cannot walk.  · You notice changes in the black spots in the center of the colored part of your eye (pupil).  · You have clear or bloody fluid coming from your nose or ears.  · You have trouble seeing.  During the next 24 hours after the injury, you must stay with someone who can watch you. This person should get help right away (call 911 in the [place] if you  start to shake and are not able to control it (have seizures), you pass out, or you are unable to wake up.  HOW CAN I PREVENT A HEAD INJURY IN THE FUTURE?  · Wear seat belts.  · Wear a helmet while bike riding and playing sports like football.  · Stay away from dangerous activities around the house.  WHEN CAN I RETURN TO NORMAL ACTIVITIES AND ATHLETICS?  See your doctor before doing these activities. You should not do normal activities or play contact sports until 1 week after the following symptoms have stopped:  · Headache that does not go away.  · Dizziness.  · Poor attention.  · Confusion.  · Memory problems.  · Sickness to your stomach or throwing up.  · Tiredness.  · Fussiness.  · Bothered by bright lights or loud noises.  · Anxiousness or depression.  · Restless sleep.  MAKE SURE YOU:   · Understand these instructions.  · Will watch your condition.  · Will get help right away if you are not doing well or get worse.     This information is not intended to replace advice given to you by your health care provider. Make sure you discuss any questions you have with your health care provider.     Document Released: 11/30/2009 Document Revised: 01/08/2016 Document Reviewed: 08/25/2014  [employer] Interactive Patient Education ©2016 Elsevier Inc.

## 2017-11-14 NOTE — ED PROVIDER NOTES
"ED Provider Note    CHIEF COMPLAINT  Chief Complaint   Patient presents with   • Head Injury     Pt states she was stocking water at work and the case broke and hit pt in head. Pt denies LOC.        HPI    Primary care provider: Merrill Cheatham M.D.   History obtained from:Patient  History limited by: None     Dorcas Dave is a 22 y.o. female who presents to the ED complaining of injury while working at [employer] around 9:00 tonight. Patient states that she was stocking water at work when the case broke and the water bottles fell on her head and her face. She reports headache and seeing white spots and feeling dizzy and wobbly but denies loss of consciousness. She denies any other injuries or pain anywhere else. She denies possibility of pregnancy. She denies nausea or vomiting. She denies any focal weakness. She denies any other neurological symptoms.    REVIEW OF SYSTEMS  Please see HPI for pertinent positives/negatives.  All other systems reviewed and are negative.     PAST MEDICAL HISTORY  Past Medical History:   Diagnosis Date   • Pneumonia 3/6/17    Not hospitalized \"mild case\".  Antibiotics and cough medicine given.   • Cold     March 9, 2017   • IBS (irritable bowel syndrome)    • Infectious disease    • Ovarian cyst 1 month ago    left, [facility]   • Pain     \"Stomach\"        SURGICAL HISTORY  Past Surgical History:   Procedure Laterality Date   • ELEFGO BY LAPAROSCOPY  3/31/2017    Procedure: ELFEGO BY LAPAROSCOPY;  Surgeon: Srinivasa Valentine M.D.;  Location: SURGERY [facility];  Service:    • GASTROSCOPY  4/11/08    Performed by ERIC ELY at SURGERY SAME DAY [facility]        SOCIAL HISTORY  Social History     Social History Main Topics   • Smoking status: Passive Smoke Exposure - Never Smoker   • Smokeless tobacco: Never Used      Comment: exposed to 1 parents smoke   • Alcohol use Yes      Comment: 3-5/week.   • Drug use: No   • Sexual activity: Not on file        FAMILY HISTORY  History " "reviewed. No pertinent family history.     CURRENT MEDICATIONS  Home Medications     Reviewed by Nahum Bello R.N. (Registered Nurse) on 11/13/17 at 2346  Med List Status: Complete   Medication Last Dose Status        Patient Timothy Taking any Medications                        ALLERGIES  Allergies   Allergen Reactions   • Nkda [No Known Drug Allergy]         PHYSICAL EXAM  VITAL SIGNS: /78   Pulse 64   Temp 36.5 °C (97.7 °F)   Resp 18   Ht 1.676 m (5' 6\")   Wt 123.3 kg (271 lb 13.2 oz)   SpO2 98%   BMI 43.87 kg/m²  @SHARIF[[id number]::@     Pulse ox interpretation:98% I interpret this pulse ox as normal     Constitutional: Well developed, well nourished, alert in no apparent distress, nontoxic appearance   HENT: Slight swelling with tenderness to the left forehead without crepitus/step-off, mild tenderness of the upper lip, normocephalic, bilateral external ears normal, no hemotympanum bilaterally, oropharynx moist and clear, airway patent, no loose teeth, nose non TTP with no hematoma/bleeding/drainage, midface stable, no malocclusion, no periorbital swelling/bruising, no mastoid swelling/bruising   Eyes: PERRL, EOMI, conjunctiva without erythema, no discharge, no icterus   Neck: Soft and supple, trachea midline, no stridor, no swelling/bruising, no midline C-spine tenderness, no stepoffs, no LAD, no JVD, good ROM   Cardiovascular: Regular rate and rhythm, no murmurs/rubs/gallops, strong distal pulses and good perfusion   Thorax & Lungs: No respiratory distress, CTAB with equal BS bilaterally, no chest tenderness  Abdomen: Soft, nontender, nondistended, no G/R, normal BS, pelvis stable   Back: Nontender to palpation  Extremities: No clubbing, no cyanosis, no edema, no gross deformity, good ROM at all joints, no tenderness, intact distal pulses with brisk cap refill   Skin: Warm, dry, no pallor/cyanosis, no rash noted   Lymphatic: No lymphadenopathy noted   Neuro: A/O times 3, GCS15, no focal deficits noted, " sensation intact to touch, equal strength bilateral UE/LE, normal gait  Psychiatric: Cooperative, normal mood and affect, normal judgement, appropriate for clinical situation          DIAGNOSTIC STUDIES / PROCEDURES        LABS  All labs reviewed by me.     Results for orders placed or performed during the hospital encounter of 03/31/17   HCG QUALITATIVE URINE (Pregnancy)   Result Value Ref Range    Beta-Hcg Urine Negative Negative   REFRACTOMETER SG   Result Value Ref Range    Specific Gravity 1.027         RADIOLOGY  The radiologist's interpretation of all radiological studies have been reviewed by me.     No orders to display          COURSE & MEDICAL DECISION MAKING  Nursing notes, VS, PMSFHx reviewed in chart.     Differential diagnoses considered include but are not limited to: Contusion, concussion/post-concussion syndrome, Fx, intracranial hemorrhage, abrasion/laceration, cervical strain/sprain       Patient presents to the ED with above complaint. This is an alert, pleasant well-appearing patient in no acute distress, nontoxic in appearance without any focal or worrisome neurological findings. She is otherwise healthy and is not on blood thinners. Discussed with patient pros/cons of head CT and she declined at this time which I think is reasonable. Discussed with patient monitoring for worrisome signs and symptoms. She was advised to follow-up with occupational health. She was given return to ED precautions. She verbalized understanding and agreed with plan of care with no further questions or concerns.      The patient is referred to a primary physician for blood pressure management, diabetic screening, and for all other preventative health concerns.       FINAL IMPRESSION  1. Closed head injury, initial encounter    2. Contusion of face, initial encounter    3. Work related injury           DISPOSITION  Patient will be discharged home in stable condition.       FOLLOW UP  [facility]  [address]  [place] [address]  [phone]    Call in 1 day      [facility], Emergency Dept  [address]  [place] [id number] [phone]    If symptoms worsen         OUTPATIENT MEDICATIONS  New Prescriptions    No medications on file          Electronically signed by: Burt Adrian, 11/13/2017 11:50 PM      Portions of this record were made with voice recognition software.  Despite my review, spelling/grammar/context errors may still remain.  Interpretation of this chart should be taken in this context.

## 2017-11-14 NOTE — ED NOTES
"Chief Complaint   Patient presents with   • Head Injury     Pt states she was stocking water at work and the case broke and hit pt in head. Pt denies LOC.     /78   Pulse 64   Temp 36.5 °C (97.7 °F)   Resp 18   Ht 1.676 m (5' 6\")   Wt 123.3 kg (271 lb 13.2 oz)   SpO2 98%   BMI 43.87 kg/m²     "

## 2017-11-21 ENCOUNTER — OFFICE VISIT (OUTPATIENT)
Dept: URGENT CARE | Facility: PHYSICIAN GROUP | Age: 22
End: 2017-11-21
Payer: COMMERCIAL

## 2017-11-21 VITALS
TEMPERATURE: 98.5 F | HEIGHT: 64 IN | OXYGEN SATURATION: 95 % | WEIGHT: 259 LBS | RESPIRATION RATE: 18 BRPM | DIASTOLIC BLOOD PRESSURE: 62 MMHG | HEART RATE: 102 BPM | BODY MASS INDEX: 44.22 KG/M2 | SYSTOLIC BLOOD PRESSURE: 106 MMHG

## 2017-11-21 DIAGNOSIS — R11.2 NAUSEA AND VOMITING, INTRACTABILITY OF VOMITING NOT SPECIFIED, UNSPECIFIED VOMITING TYPE: ICD-10-CM

## 2017-11-21 DIAGNOSIS — K52.9 GASTROENTERITIS: Primary | ICD-10-CM

## 2017-11-21 LAB
APPEARANCE UR: NORMAL
BILIRUB UR STRIP-MCNC: NORMAL MG/DL
COLOR UR AUTO: NORMAL
GLUCOSE UR STRIP.AUTO-MCNC: NORMAL MG/DL
KETONES UR STRIP.AUTO-MCNC: NORMAL MG/DL
LEUKOCYTE ESTERASE UR QL STRIP.AUTO: NORMAL
NITRITE UR QL STRIP.AUTO: NORMAL
PH UR STRIP.AUTO: 6 [PH] (ref 5–8)
PROT UR QL STRIP: 100 MG/DL
RBC UR QL AUTO: NORMAL
SP GR UR STRIP.AUTO: 1.03
UROBILINOGEN UR STRIP-MCNC: NORMAL MG/DL

## 2017-11-21 PROCEDURE — 99214 OFFICE O/P EST MOD 30 MIN: CPT | Performed by: PHYSICIAN ASSISTANT

## 2017-11-21 PROCEDURE — 81002 URINALYSIS NONAUTO W/O SCOPE: CPT | Performed by: PHYSICIAN ASSISTANT

## 2017-11-21 RX ORDER — ONDANSETRON 4 MG/1
4 TABLET, ORALLY DISINTEGRATING ORAL EVERY 8 HOURS PRN
Qty: 10 TAB | Refills: 0 | Status: SHIPPED | OUTPATIENT
Start: 2017-11-21 | End: 2020-03-16

## 2017-11-21 RX ORDER — DIPHENOXYLATE HYDROCHLORIDE AND ATROPINE SULFATE 2.5; .025 MG/1; MG/1
1 TABLET ORAL 4 TIMES DAILY PRN
Qty: 30 TAB | Refills: 0 | Status: SHIPPED | OUTPATIENT
Start: 2017-11-21 | End: 2020-03-16

## 2017-11-21 RX ORDER — ONDANSETRON 4 MG/1
4 TABLET, ORALLY DISINTEGRATING ORAL ONCE
Status: COMPLETED | OUTPATIENT
Start: 2017-11-21 | End: 2017-11-21

## 2017-11-21 RX ADMIN — ONDANSETRON 4 MG: 4 TABLET, ORALLY DISINTEGRATING ORAL at 11:26

## 2017-11-21 ASSESSMENT — ENCOUNTER SYMPTOMS
HEADACHES: 1
CARDIOVASCULAR NEGATIVE: 1
DIARRHEA: 1
VOMITING: 1
ABDOMINAL PAIN: 1
EYES NEGATIVE: 1
MUSCULOSKELETAL NEGATIVE: 1
RESPIRATORY NEGATIVE: 1
PSYCHIATRIC NEGATIVE: 1
NAUSEA: 1

## 2017-11-21 NOTE — PROGRESS NOTES
"Subjective:      Dorcas Dave is a 22 y.o. female who presents with Emesis (x 1 day ) and Diarrhea (x 1 day )            HPI  Chief Complaint   Patient presents with   • Emesis     x 1 day    • Diarrhea     x 1 day        HPI:  Dorcas Dave is a 22 y.o. female who presents with diarrhea and vomiting since yesterday.  Couldn't get out of bed.  Right sided stomach pain with radiation all the way around.  Hx cholecystectomy in March.  Did try pepto, caused vomiting.  Did try tums darrian with n/v.  Diarrhea also started yesterday.  Every hour, 6+ times a day.  Can't keep water down.  No other sick contacts.  Some hot flashes.  Did not get flu vaccine.    Patient denies HA, SOB, chest pain, palpitations, fever, chills.      Past Medical History:   Diagnosis Date   • Cold     March 9, 2017   • IBS (irritable bowel syndrome)    • Infectious disease    • Ovarian cyst 1 month ago    left, [facility]   • Pain     \"Stomach\"   • Pneumonia 3/6/17    Not hospitalized \"mild case\".  Antibiotics and cough medicine given.       Past Surgical History:   Procedure Laterality Date   • ELFEGO BY LAPAROSCOPY  3/31/2017    Procedure: ELFEGO BY LAPAROSCOPY;  Surgeon: Srinivasa Valentine M.D.;  Location: SURGERY [facility];  Service:    • GASTROSCOPY  4/11/08    Performed by ERIC ELY at SURGERY SAME DAY [facility]       History reviewed. No pertinent family history.  No pertinent family history.    Social History     Social History   • Marital status: Single     Spouse name: N/A   • Number of children: N/A   • Years of education: N/A     Occupational History   • Not on file.     Social History Main Topics   • Smoking status: Passive Smoke Exposure - Never Smoker   • Smokeless tobacco: Never Used      Comment: exposed to 1 parents smoke   • Alcohol use Yes      Comment: 3-5/week.   • Drug use: No   • Sexual activity: Not on file     Other Topics Concern   • Not on file     Social History Narrative   • No narrative on file " "      No current outpatient prescriptions on file.    Allergies   Allergen Reactions   • Nkda [No Known Drug Allergy]         Review of Systems   Constitutional: Positive for malaise/fatigue.   HENT: Negative.    Eyes: Negative.    Respiratory: Negative.    Cardiovascular: Negative.    Gastrointestinal: Positive for abdominal pain, diarrhea, nausea and vomiting.   Genitourinary: Negative.    Musculoskeletal: Negative.    Skin: Negative.    Neurological: Positive for headaches.   Endo/Heme/Allergies: Negative.    Psychiatric/Behavioral: Negative.           Objective:     /62   Pulse (!) 102   Temp 36.9 °C (98.5 °F)   Resp 18   Ht 1.626 m (5' 4\")   Wt 117.5 kg (259 lb)   SpO2 95%   BMI 44.46 kg/m²      Physical Exam       Nursing note and vitals reviewed.    Constitutional:  Appropriately groomed, pleasant affect, and in no acute distress.    Head: normocephalic and atraumatic.    Eye:   PERRLA, EOM's full, sclera white, no scleral icterus, conjunctiva not erythematous, and medial canthus without exudate bilaterally.    Ears:  Hearing grossly intact to voice.    Throat:  Oropharynx not erythematous, with no enlargement of the palatine tonsils bilaterally with no exudates.    Posterior oropharynx with no post nasal drainage present.  Soft palate rises symmetrically bilaterally and uvula midline.  Neck supple, with mild proximal anterior cervical chain lymphadenopathy that is soft and mobile to palpation.  Thyroid non-palpable.  No supraclavicular lymphadenopathy.    Lungs:  Lungs with normal respiratory excursion and effort.    Heart:  RRR, without murmurs, rubs, or gallops.  Carotid arteries without bruits bilaterally.  Radial and dorsalis pedis pulses 2+ bilaterally    Abdomen:  Protuberant without striations, ecchymosis, or lesions.  Surgical scars present consistent with prior history of cholecystectomy. Bowel sounds present all 4Qs, hyperactive. Generalized TTP.  No masses to palpation.  No " hepatosplenomegaly, no TTP at McBurney's point, negative Limon's sign, no rebound tenderness, and no guarding.  No CVA tenderness bilaterally.    MSK:  Gait and station wnl, non antalgic.    Derm:  No rashes or lesions with good turgor pressure.     Psychiatric:  Normal judgement, mood and affect.      Assessment/Plan:     1. Gastroenteritis  ondansetron (ZOFRAN ODT) 4 MG TABLET DISPERSIBLE    diphenoxylate-atropine (LOMOTIL) 2.5-0.025 MG Tab   2. Nausea and vomiting, intractability of vomiting not specified, unspecified vomiting type  ondansetron (ZOFRAN ODT) dispertab 4 mg    POCT Urinalysis    ondansetron (ZOFRAN ODT) 4 MG TABLET DISPERSIBLE    diphenoxylate-atropine (LOMOTIL) 2.5-0.025 MG Tab     Patient presents with nausea and vomiting and fatigue and hot flashes since yesterday. Did try Pepto and Tums with no improvement. Unable to keep fluids down. On exam patient is afebrile and sitting comfortable in our exam room table. Post-Zofran is able to tolerate water. Urine analysis unremarkable. Suspect viral etiology and recommended Zofran and Imodium. Bowel reset diet, progress diet as tolerated.    Patient was in agreement with this treatment plan and seemed to understand without barriers. All questions were encouraged and answered.  Reviewed signs and symptoms of when to seek emergency medical care.     Please note that this dictation was created using voice recognition software.  I have made every reasonable attempt to correct obvious errors, but I expect there are errors of jamie and possibly content that I did not discover before finalizing the note.

## 2017-11-21 NOTE — LETTER
November 21, 2017         Patient: Dorcas Dave   YOB: 1995   Date of Visit: 11/21/2017           To Whom it May Concern:    Dorcas Dave was seen in my clinic on 11/21/2017. Please excuse her from work 11/22.    If you have any questions or concerns, please don't hesitate to call.        Sincerely,           Enzo Black P.A.-C.  Electronically Signed

## 2018-11-28 ENCOUNTER — OCCUPATIONAL MEDICINE (OUTPATIENT)
Dept: URGENT CARE | Facility: CLINIC | Age: 23
End: 2018-11-28
Payer: COMMERCIAL

## 2018-11-28 ENCOUNTER — APPOINTMENT (OUTPATIENT)
Dept: RADIOLOGY | Facility: IMAGING CENTER | Age: 23
End: 2018-11-28
Attending: NURSE PRACTITIONER
Payer: COMMERCIAL

## 2018-11-28 VITALS
TEMPERATURE: 98 F | OXYGEN SATURATION: 95 % | SYSTOLIC BLOOD PRESSURE: 110 MMHG | RESPIRATION RATE: 18 BRPM | HEART RATE: 88 BPM | DIASTOLIC BLOOD PRESSURE: 72 MMHG | WEIGHT: 246 LBS | HEIGHT: 64 IN | BODY MASS INDEX: 42 KG/M2

## 2018-11-28 DIAGNOSIS — S60.222A CONTUSION OF LEFT HAND, INITIAL ENCOUNTER: ICD-10-CM

## 2018-11-28 PROCEDURE — 73130 X-RAY EXAM OF HAND: CPT | Mod: 26,LT,29 | Performed by: NURSE PRACTITIONER

## 2018-11-28 PROCEDURE — 99213 OFFICE O/P EST LOW 20 MIN: CPT | Mod: 29 | Performed by: NURSE PRACTITIONER

## 2018-11-28 NOTE — PROGRESS NOTES
"Subjective:      Dorcas Dave is a 23 y.o. female who presents with Hand Injury ([facility], PT states she injured her hand while moving a pallot, concerned about possible break. )      DOI: 11/27/18   Patient was moving a pallet off of the line and onto a table; the table was uneven and the pallet started to fall.  The bar from the pallet crushed the patient's left 5th finger. She was attended to by a paramedic at the scene who immobilized the finger but did not see deformity. Patient states today she continues to have pain and stiffness in the finger with painful ROM at the PIP and MCP joint. No other injuries.      Hand Injury   This is a new problem. The current episode started yesterday (11/27/18). The problem occurs constantly. The problem has been unchanged. Associated symptoms comments: Pain and stiffness in the left 5th finger and in the mcp joint. Exacerbated by: movement. She has tried immobilization and rest for the symptoms. The treatment provided no relief.       Review of Systems   Musculoskeletal:        Pain and stiffness in the left 5th finger.   All other systems reviewed and are negative.         Objective:     /72 (BP Location: Left arm, Patient Position: Sitting, BP Cuff Size: Adult)   Pulse 88   Temp 36.7 °C (98 °F) (Temporal)   Resp 18   Ht 1.626 m (5' 4\")   Wt 111.6 kg (246 lb)   SpO2 95%   BMI 42.23 kg/m²      Physical Exam   Constitutional: She appears well-developed and well-nourished.   Musculoskeletal:        Hands:  Skin: Capillary refill takes less than 2 seconds.   Psychiatric: She has a normal mood and affect. Her behavior is normal. Judgment and thought content normal.   Vitals reviewed.      Mild STS over the base of the 5th finger and the dorsal ulnar aspect of the left hand. Painful ROM at the PIP and MCP joint. Slight discoloration over the digit.  No obvious deformity.        XR left hand:       11/28/2018 9:25 AM    HISTORY/REASON FOR EXAM:  Left hand pain, " worse around fifth digit after dropping pallet on hand.      TECHNIQUE/EXAM DESCRIPTION AND NUMBER OF VIEWS:  3 views of the LEFT hand.    COMPARISON: None    FINDINGS:  Bone mineralization is age appropriate. Bony alignment is anatomic. There is no evidence of acute fracture or dislocation.   Impression       No radiographic evidence of acute traumatic injury.       Assessment/Plan:     1. Contusion of left hand, initial encounter    - DX-HAND 3+ LEFT; Future  -immobilize  -ice  -ibuprofen  -see D39 for restrictions  -Return on 12/3/18 for follow up.

## 2018-11-28 NOTE — LETTER
"EMPLOYEE’S CLAIM FOR COMPENSATION/ REPORT OF INITIAL TREATMENT  FORM C-4    EMPLOYEE’S CLAIM - PROVIDE ALL INFORMATION REQUESTED   First Name  Dorcas Last Name  Tenzin Birthdate                    1995                Sex  female Claim Number   Home Address  [address] Age  23 y.o. Height  1.626 m (5' 4\") Weight  111.6 kg (246 lb) [facility]     [place] Zip  [address] Telephone  [phone] (home)    Mailing Address  [address] [place]  [address] Primary Language Spoken  English    Insurer  [employer] Third Party   [employer]   Employee's Occupation (Job Title) When Injury or Occupational Disease Occurred      Employer's Name  [employer]  Telephone  [phone]    Employer Address  [address]  [facility]  Zip  [address]    Date of Injury  11/27/2018               Hour of Injury  11:30 PM Date Employer Notified  11/27/2018 Last Day of Work after Injury or Occupational Disease  11/28/2018 Supervisor to Whom Injury Reported  Parker Duran   Address or Location of Accident (if applicable)  [[employer]]   What were you doing at the time of accident? (if applicable)  Moving pallets off the line    How did this injury or occupational disease occur? (Be specific an answer in detail. Use additional sheet if necessary)  The pallet hit into hi-boy and I tried to keep pallet from hitting floor. I ended up smashing my hand   If you believe that you have an occupational disease, when did you first have knowledge of the disability and it relationship to your employment?  n/a Witnesses to the Accident  Lili Caldwell Gabby, Howard, Peter      Nature of Injury or Occupational Disease  Workers' Compensation  Part(s) of Body Injured or Affected  Finger (L), Hand (L),     I certify that the above is true and correct to the best of my knowledge and that I have " provided this information in order to obtain the benefits of [place]  I hereby authorize any physician, chiropractor, surgeon, practitioner, or other person, any hospital, including [facility] or [facility] hospital, any medical service organization, any insurance company, or other institution or organization to release to each other, any medical or other information, including benefits paid or payable, pertinent to this injury or disease, except information relative to diagnosis, treatment and/or counseling for AIDS, psychological conditions, alcohol or controlled substances, for which I must give specific authorization.  A Photostat of this authorization shall be as valid as the original.     Date 11/28/18   [facility]   Employee’s Signature   THIS REPORT MUST BE COMPLETED AND MAILED WITHIN 3 WORKING DAYS OF TREATMENT   Place  [facility]  Name of Facility  [facility]   Date  11/28/2018 Diagnosis  (S60.222A) Contusion of left hand, initial encounter Is there evidence the injured employee was under the influence of alcohol and/or another controlled substance at the time of accident?   Hour  9:04 AM Description of Injury or Disease  The encounter diagnosis was Contusion of left hand, initial encounter. No   Treatment  Immobilization, ice, ibuprofen ,rest  Have you advised the patient to remain off work five days or more? No   X-Ray Findings  Negative   If Yes   From Date  To Date      From information given by the employee, together with medical evidence, can you directly connect this injury or occupational disease as job incurred?  Yes If No Full Duty  No Modified Duty  Yes   Is additional medical care by a physician indicated?  Yes If Modified Duty, Specify any Limitations / Restrictions  Limited use of the left hand   Do you know of any previous injury or disease  "contributing to this condition or occupational disease?                            No   Date  11/28/2018 Print Doctor’s Name Cathey J Hamman, A.P.N. I certify the employer’s copy of  this form was mailed on:   Address  [address] [facility] [address] Insurer’s Use Only     [place] [facility] Zip  [address]    Provider’s Tax ID Number  [id number] Telephone  Dept: [phone]        antonio-SignHAMMAN, CATHEY J A.P.N.   e-Signature: Dr. Shiva Tarango, Medical Director Degree  APN        ORIGINAL-TREATING PHYSICIAN OR CHIROPRACTOR    PAGE 2-INSURER/TPA    PAGE 3-EMPLOYER    PAGE 4-EMPLOYEE             Form C-4 (rev10/07)              BRIEF DESCRIPTION OF RIGHTS AND BENEFITS  (Pursuant to [place]RS 616C.050)    Notice of Injury or Occupational Disease (Incident Report Form C-1): If an injury or occupational disease (OD) arises out of and in the  course of employment, you must provide written notice to your employer as soon as practicable, but no later than 7 days after the accident or  OD. Your employer shall maintain a sufficient supply of the required forms.    Claim for Compensation (Form C-4): If medical treatment is sought, the form C-4 is available at the place of initial treatment. A completed  \"Claim for Compensation\" (Form C-4) must be filed within 90 days after an accident or OD. The treating physician or chiropractor must,  within 3 working days after treatment, complete and mail to the employer, the employer's insurer and third-party , the Claim for  Compensation.    Medical Treatment: If you require medical treatment for your on-the-job injury or OD, you may be required to select a physician or  chiropractor from a list provided by your workers’ compensation insurer, if it has contracted with an Organization for Managed Care (MCO) or  Preferred Provider Organization (PPO) or providers of health care. If your employer has not entered into a contract with an MCO or PPO, you  may select a physician or " chiropractor from the Panel of Physicians and Chiropractors. Any medical costs related to your industrial injury or  OD will be paid by your insurer.    Temporary Total Disability (TTD): If your doctor has certified that you are unable to work for a period of at least 5 consecutive days, or 5  cumulative days in a 20-day period, or places restrictions on you that your employer does not accommodate, you may be entitled to TTD  compensation.    Temporary Partial Disability (TPD): If the wage you receive upon reemployment is less than the compensation for TTD to which you are  entitled, the insurer may be required to pay you TPD compensation to make up the difference. TPD can only be paid for a maximum of 24  months.    Permanent Partial Disability (PPD): When your medical condition is stable and there is an indication of a PPD as a result of your injury or  OD, within 30 days, your insurer must arrange for an evaluation by a rating physician or chiropractor to determine the degree of your PPD. The  amount of your PPD award depends on the date of injury, the results of the PPD evaluation and your age and wage.    Permanent Total Disability (PTD): If you are medically certified by a treating physician or chiropractor as permanently and totally disabled  and have been granted a PTD status by your insurer, you are entitled to receive monthly benefits not to exceed 66 2/3% of your average  monthly wage. The amount of your PTD payments is subject to reduction if you previously received a PPD award.    Vocational Rehabilitation Services: You may be eligible for vocational rehabilitation services if you are unable to return to the job due to a  permanent physical impairment or permanent restrictions as a result of your injury or occupational disease.    Transportation and Per Sunny Reimbursement: You may be eligible for travel expenses and per sunny associated with medical treatment.    Reopening: You may be able to reopen your  claim if your condition worsens after claim closure.    Appeal Process: If you disagree with a written determination issued by the insurer or the insurer does not respond to your request, you may  appeal to the Department of Administration, , by following the instructions contained in your determination letter. You must  appeal the determination within 70 days from the date of the determination letter at [address], [address], [place], [place]  [address], or [address], [place], [place] [address]. If you disagree with the  decision, you may appeal to the  Department of Administration, . You must file your appeal within 30 days from the date of the  decision  letter at [address], [address], [place], [place] [address], or [address], [place], [place] [address]. If you  disagree with a decision of an , you may file a petition for judicial review with the District Court. You must do so within 30  days of the Appeal Officer’s decision. You may be represented by an  at your own expense or you may contact the [facility] for possible  representation.    [place]  for Injured Workers (NAIW): If you disagree with a  decision, you may request that NAIW represent you  without charge at an  Hearing. For information regarding denial of benefits, you may contact the [facility] at: [address], [address], [place] [address], [phone], or [address], [place] [address], [phone]    To File a Complaint with the Division: If you wish to file a complaint with the  of the Division of Industrial Relations (DIR),  please contact the Workers’ Compensation Section, [address], [address], [place], [place] [address], telephone ([phone]) [phone], or  [address], [place]  [address], telephone [phone].    For assistance with Workers’ Compensation Issues: you may contact the Office of the Governor Consumer Health Assistance, [address] [place] [address], [address], [place] [address], Toll Free 1-346.200.9571, Web site: [email or web address].[email or web address]., E-mail  [email or web address].                                                                                                                                                                                                                                   __________________________________________________________________                                                                   _____11/28/18____________                Employee Name / Signature                                                                                                                                                       Date                                                                                                                                                                                                     D-2 (rev. 10/07)

## 2018-11-28 NOTE — LETTER
[facility]  [address] [address] [place] [address]  Phone:  [phone] - Fax:  [phone]   [facility] Progress Report and Disability Certification  Date of Service: 11/28/2018   No Show:  No  Date / Time of Next Visit: 12/3/2018   Claim Information   Patient Name: Dorcas Dave  Claim Number:     Employer: [employer]  Date of Injury: 11/27/2018     Insurer / TPA: [employer]  ID / SSN:     Occupation:   Diagnosis: The encounter diagnosis was Contusion of left hand, initial encounter.    Medical Information   Related to Industrial Injury? Yes    Subjective Complaints:  DOI: 11/27/18   Patient was moving a pallet off of the line and onto a table; the table was uneven and the pallet started to fall.  The bar from the pallet crushed the patient's left 5th finger. She was attended to by a paramedic at the scene who immobilized the finger but did not see deformity. Patient states today she continues to have pain and stiffness in the finger with painful ROM at the PIP and MCP joint. No other injuries.    Objective Findings: Mild STS over the base of the 5th finger and the dorsal ulnar aspect of the left hand. Painful ROM at the PIP and MCP joint. Slight discoloration over the digit.  No obvious deformity.    Pre-Existing Condition(s):     Assessment:   Initial Visit    Status: Additional Care Required  Permanent Disability:No    Plan: Medication  Comments:ibuprofen    Diagnostics: X-ray negative    Comments:       Disability Information   Status: Released to Restricted Duty    From:  11/28/2018  Through: 12/3/2018 Restrictions are: Temporary   Physical Restrictions   Sitting:    Standing:    Stooping:    Bending:      Squatting:    Walking:    Climbing:    Pushing:  < or = to 1 hr/day   Pulling:  < or = to 1 hr/day Other:    Reaching Above Shoulder (L):   Reaching Above Shoulder (R):       Reaching Below Shoulder (L):    Reaching  Below Shoulder (R):      Not to exceed Weight Limits   Carrying(hrs):   Weight Limit(lb): < or = to 10 pounds Lifting(hrs):   Weight  Limit(lb): < or = to 10 pounds   Comments: Restrictions specific to the left hand. Ibuprofen as needed. Immobilize. Return on 12/3/18 for follow up.     Repetitive Actions   Hands: i.e. Fine Manipulations from Grasping: < or = to 2 hrs/day   Feet: i.e. Operating Foot Controls:     Driving / Operate Machinery:     Physician Name: Cathey J Hamman, A.P.N. Physician Signature: e-SignHAMMAN, CATHEY J A.P.N. e-Signature: Dr. Shiva Tarango, Medical Director   Clinic Name / Location: [address], [address]  [place] [address] Clinic Phone Number: Dept: [phone]   Appointment Time: 8:30 Am Visit Start Time: 9:04 AM   Check-In Time:  8:30 Am Visit Discharge Time:  9:48 AM   Original-Treating Physician or Chiropractor    Page 2-Insurer/TPA    Page 3-Employer    Page 4-Employee

## 2018-12-07 ENCOUNTER — OCCUPATIONAL MEDICINE (OUTPATIENT)
Dept: URGENT CARE | Facility: PHYSICIAN GROUP | Age: 23
End: 2018-12-07
Payer: COMMERCIAL

## 2018-12-07 VITALS
DIASTOLIC BLOOD PRESSURE: 82 MMHG | TEMPERATURE: 98.2 F | OXYGEN SATURATION: 96 % | SYSTOLIC BLOOD PRESSURE: 128 MMHG | HEART RATE: 91 BPM

## 2018-12-07 DIAGNOSIS — S60.222D CONTUSION OF LEFT HAND, SUBSEQUENT ENCOUNTER: ICD-10-CM

## 2018-12-07 PROCEDURE — 99213 OFFICE O/P EST LOW 20 MIN: CPT | Mod: 29 | Performed by: NURSE PRACTITIONER

## 2018-12-07 ASSESSMENT — ENCOUNTER SYMPTOMS
FEVER: 0
BRUISES/BLEEDS EASILY: 0
WEAKNESS: 0
TINGLING: 0
SENSORY CHANGE: 0
FALLS: 0
CHILLS: 0
MYALGIAS: 1

## 2018-12-07 NOTE — PROGRESS NOTES
"Subjective:      Dorcas Dave is a 23 y.o. female who presents with Hand Injury ((left) still has some bruising and pain. In the 5th metacarpal. )            HPI  DOI 11/828/18. Pallet fell from table amd crushed the patient's left 5th finger. States will see intermittent bruising at 5th metacrapal region. States little improvement since last visit. Pain and stiffness of finger with decreased ROM at PIP and MCP joint. Using straight finger splint with ace wrap to hand. 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 Denies heavy lifting. Mild tingling in left hand. States Ibuprofen does not help pain and swelling. Works Sunday-Wednesday shift x 12hrs. Left hand dominant.    PMH:  has a past medical history of Cold; IBS (irritable bowel syndrome); Infectious disease; Ovarian cyst (1 month ago); Pain; and Pneumonia (3/6/17).  MEDS:   Current Outpatient Prescriptions:   •  ondansetron (ZOFRAN ODT) 4 MG TABLET DISPERSIBLE, Take 1 Tab by mouth every 8 hours as needed., Disp: 10 Tab, Rfl: 0  •  diphenoxylate-atropine (LOMOTIL) 2.5-0.025 MG Tab, Take 1 Tab by mouth 4 times a day as needed for Diarrhea., Disp: 30 Tab, Rfl: 0  ALLERGIES:   Allergies   Allergen Reactions   • Nkda [No Known Drug Allergy]      SURGHX:   Past Surgical History:   Procedure Laterality Date   • ELFEGO BY LAPAROSCOPY  3/31/2017    Procedure: ELFEGO BY LAPAROSCOPY;  Surgeon: Srinivasa Valentine M.D.;  Location: SURGERY [facility];  Service:    • GASTROSCOPY  4/11/08    Performed by ERIC ELY at SURGERY SAME DAY [facility]     SOCHX:  reports that she is a non-smoker but has been exposed to tobacco smoke. She has never used smokeless tobacco. She reports that she drinks alcohol. She reports that she does not use drugs.  FH: Family history was reviewed, no pertinent findings to report    Review of Systems   Constitutional: Negative for chills, fever and malaise/fatigue.   Musculoskeletal: Positive for joint pain " and myalgias. Negative for falls.   Skin: Negative for itching and rash.   Neurological: Negative for tingling, sensory change and weakness.   Endo/Heme/Allergies: Does not bruise/bleed easily.   All other systems reviewed and are negative.         Objective:     /82   Pulse 91   Temp 36.8 °C (98.2 °F)   SpO2 96%      Physical Exam   Constitutional: She is oriented to person, place, and time. Vital signs are normal. She appears well-developed and well-nourished. She is active and cooperative.  Non-toxic appearance. She does not have a sickly appearance. She does not appear ill. No distress.   HENT:   Head: Normocephalic.   Eyes: Pupils are equal, round, and reactive to light. EOM are normal.   Cardiovascular: Normal rate.    Pulmonary/Chest: Effort normal.   Musculoskeletal: She exhibits tenderness. She exhibits no edema or deformity.        Left hand: She exhibits decreased range of motion, tenderness and bony tenderness. She exhibits normal two-point discrimination, normal capillary refill, no deformity, no laceration and no swelling. Normal sensation noted. Decreased strength noted. She exhibits finger abduction and thumb/finger opposition.        Hands:  Neurological: She is alert and oriented to person, place, and time.   Skin: Skin is warm and dry. No rash noted. She is not diaphoretic. No erythema.   Vitals reviewed.            A/O x 4. Skin p/w/d, skin sensation intact. Decreased ROM with bending 5th finger when making fist due to pain and stiffness. No swelling but has mild shadowing seen at site. TTP at 2nd PIP and 1st MCP joints. Buddy tape 4th and 5th digits with coban at this time.    Assessment/Plan:     1. Contusion of left hand, subsequent encounter    May continue Ibuprofen and ice as needed for pain and swelling, may switch from straight finger splint and ace to buddy taping with coban to help with hand gripping. May perform small hand exercise with hand stress ball to improve ROM. Recheck  on 12/14/18.

## 2018-12-07 NOTE — LETTER
"   [facility] [address] - [place] [address]  Phone:  [phone] - Fax:  [phone]   [facility] Progress Report and Disability Certification  Date of Service: 12/7/2018   No Show:  No  Date / Time of Next Visit: 12/14/2018   Claim Information   Patient Name: Dorcas Dave  Claim Number:     Employer: [employer]  Date of Injury: 11/27/2018     Insurer / TPA: [employer]  ID / SSN:     Occupation:   Diagnosis: The encounter diagnosis was Contusion of left hand, subsequent encounter.    Medical Information   Related to Industrial Injury? Yes    Subjective Complaints:  DOI 11/828/18. Pallet fell from table amd crushed the patient's left 5th finger. States will see intermittent bruising at 5th metacrapal region. States little improvement since last visit. Pain and stiffness of finger with decreased ROM at PIP and MCP joint. Using straight finger splint with ace wrap to hand. 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 Denies heavy lifting. Mild tingling in left hand. States Ibuprofen does not help pain and swelling. Works Sunday-Wednesday shift x 12hrs. Left hand dominant.   Objective Findings: A/O x 4. Skin p/w/d, skin sensation intact. Decreased ROM with bending 5th finger when making fist due to pain and stiffness. No swelling but has mild shadowing seen at site. TTP at 2nd PIP and 1st MCP joints. Rolf tape 4th and 5th digits with coban at this time.   Pre-Existing Condition(s):     Assessment:   Condition Same    Status: Additional Care Required  Permanent Disability:No    Plan:      Diagnostics:      Comments:       Disability Information   Status: Released to Restricted Duty    From:  12/7/2018  Through: 12/14/2018 Restrictions are: Temporary   Physical Restrictions   Sitting:    Standing:    Stooping:    Bending:      Squatting:    Walking:    Climbing:    Pushing:  < or = to 6 " hrs/day  Comments:left hand   Pulling:  < or = to 6 hrs/day  Comments:left hand Other:    Reaching Above Shoulder (L):   Reaching Above Shoulder (R):       Reaching Below Shoulder (L):    Reaching Below Shoulder (R):      Not to exceed Weight Limits   Carrying(hrs):   Weight Limit(lb): < or = to 10 pounds Lifting(hrs):   Weight  Limit(lb): < or = to 10 pounds   Comments: May continue Ibuprofen and ice as needed for pain and swelling, may switch from straight finger splint and ace to buddy taping with coban to help with hand gripping. May perform small hand exercise with hand stress ball to improve ROM. Recheck on 12/14/18.    Repetitive Actions   Hands: i.e. Fine Manipulations from Grasping: < or = to 6 hrs/day   Feet: i.e. Operating Foot Controls:     Driving / Operate Machinery:     Physician Name: CHING Washburn Physician Signature: JAMEY Boss e-Signature: Dr. Shiva Tarango, Medical Director   Clinic Name / Location: [address]  [place] [address] Clinic Phone Number: Dept: [phone]   Appointment Time: 3:10 Pm Visit Start Time: 3:14 PM   Check-In Time:  3:10 Pm Visit Discharge Time: 4:00 PM    Original-Treating Physician or Chiropractor    Page 2-Insurer/TPA    Page 3-Employer    Page 4-Employee

## 2018-12-07 NOTE — LETTER
"   [facility] [address] - [place] [address]  Phone:  [phone] - Fax:  [phone]   [facility] Progress Report and Disability Certification  Date of Service: 12/7/2018   No Show:  No  Date / Time of Next Visit: 12/14/2018   Claim Information   Patient Name: Dorcas Dave  Claim Number:     Employer: [employer]  Date of Injury: 11/27/2018     Insurer / TPA: [employer]  ID / SSN:     Occupation:   Diagnosis: The encounter diagnosis was Contusion of left hand, subsequent encounter.    Medical Information   Related to Industrial Injury? Yes    Subjective Complaints:  DOI 11/828/18. Pallet fell from table amd crushed the patient's left 5th finger. States will see intermittent bruising at 5th metacrapal region. States little improvement since last visit. Pain and stiffness of finger with decreased ROM at PIP and MCP joint. Using straight finger splint with ace wrap to hand. 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 Denies heavy lifting. Mild tingling in left hand. States Ibuprofen does not help pain and swelling. Works Sunday-Wednesday shift x 12hrs. Left hand dominant.   Objective Findings: A/O x 4. Skin p/w/d, skin sensation intact. Decreased ROM with bending 5th finger when making fist due to pain and stiffness. No swelling but has mild shadowing seen at site. TTP at 2nd PIP and 1st MCP joints. Rolf tape 4th and 5th digits with coban at this time.   Pre-Existing Condition(s):     Assessment:   Condition Same    Status: Additional Care Required  Permanent Disability:No    Plan:      Diagnostics:      Comments:       Disability Information   Status: Released to Restricted Duty    From:  12/7/2018  Through: 12/14/2018 Restrictions are: Temporary   Physical Restrictions   Sitting:    Standing:    Stooping:    Bending:      Squatting:    Walking:    Climbing:    Pushing:  < or = to 6 " hrs/day  Comments:left hand   Pulling:  < or = to 6 hrs/day  Comments:left hand Other:    Reaching Above Shoulder (L):   Reaching Above Shoulder (R):       Reaching Below Shoulder (L):    Reaching Below Shoulder (R):      Not to exceed Weight Limits   Carrying(hrs):   Weight Limit(lb): < or = to 10 pounds Lifting(hrs):   Weight  Limit(lb): < or = to 10 pounds   Comments: May continue Ibuprofen and ice as needed for pain and swelling, may switch from straight finger splint and ace to buddy taping with coban to help with hand gripping. May perform small hand exercise with hand stress ball to improve ROM. Recheck on 12/14/18.    Repetitive Actions   Hands: i.e. Fine Manipulations from Grasping: < or = to 6 hrs/day   Feet: i.e. Operating Foot Controls:     Driving / Operate Machinery:     Physician Name: CHING Washburn Physician Signature: JAMEY Boss e-Signature: Dr. Shiva Tarango, Medical Director   Clinic Name / Location: [address]  [place] [address] Clinic Phone Number: Dept: [phone]   Appointment Time: 3:10 Pm Visit Start Time: 3:14 PM   Check-In Time:  3:10 Pm Visit Discharge Time: 4:02 Pm    Original-Treating Physician or Chiropractor    Page 2-Insurer/TPA    Page 3-Employer    Page 4-Employee

## 2018-12-14 ENCOUNTER — OCCUPATIONAL MEDICINE (OUTPATIENT)
Dept: URGENT CARE | Facility: PHYSICIAN GROUP | Age: 23
End: 2018-12-14
Payer: COMMERCIAL

## 2018-12-14 VITALS
BODY MASS INDEX: 38.73 KG/M2 | HEIGHT: 66 IN | TEMPERATURE: 97.4 F | SYSTOLIC BLOOD PRESSURE: 120 MMHG | RESPIRATION RATE: 18 BRPM | DIASTOLIC BLOOD PRESSURE: 82 MMHG | OXYGEN SATURATION: 98 % | WEIGHT: 241 LBS | HEART RATE: 65 BPM

## 2018-12-14 DIAGNOSIS — S60.222D CONTUSION OF LEFT HAND, SUBSEQUENT ENCOUNTER: ICD-10-CM

## 2018-12-14 PROCEDURE — 99213 OFFICE O/P EST LOW 20 MIN: CPT | Mod: 29 | Performed by: PHYSICIAN ASSISTANT

## 2018-12-14 NOTE — LETTER
[facility]  [address] [place] [address]. [address] - [place] [address]  Phone:  [phone] - Fax:  [phone]   [facility] Progress Report and Disability Certification  Date of Service: 12/14/2018   No Show:  No  Date / Time of Next Visit: 12/21/2018   Claim Information   Patient Name: Dorcas Dave  Claim Number:     Employer: [employer]  Date of Injury: 11/27/2018     Insurer / TPA: [employer]  ID / SSN:     Occupation:   Diagnosis: The encounter diagnosis was Contusion of left hand, subsequent encounter.    Medical Information   Related to Industrial Injury? Yes    Subjective Complaints:  DOI: 11/27/18. Crush injury 5th digit left hand, with NEG xray. Improving. Still mild pain with certain movements and gripping. No radiation. Taking NSAIDS prn. On light duty, tolerating well. Denies new sxs.    Objective Findings: Mild tenderness MP joint of fifth digit left hand.  No bruising, swelling seen.  Range of motion normal.  Strength equal.  Distal neurovascular intact.     Pre-Existing Condition(s):     Assessment:   Condition Improved    Status: Additional Care Required  Permanent Disability:No    Plan:      Diagnostics:      Comments:       Disability Information   Status: Released to Restricted Duty    From:  12/14/2018  Through: 12/21/2018 Restrictions are:     Physical Restrictions   Sitting:    Standing:    Stooping:    Bending:      Squatting:    Walking:    Climbing:    Pushing:  < or = to 6 hrs/day   Pulling:  < or = to 6 hrs/day Other:    Reaching Above Shoulder (L):   Reaching Above Shoulder (R):       Reaching Below Shoulder (L):    Reaching Below Shoulder (R):      Not to exceed Weight Limits   Carrying(hrs):   Weight Limit(lb): < or = to 25 pounds Lifting(hrs):   Weight  Limit(lb): < or = to 25 pounds   Comments:      Repetitive Actions   Hands: i.e. Fine Manipulations from Grasping:     Feet: i.e. Operating Foot Controls:        Driving / Operate Machinery:     Physician Name: Jamil Quinonez P.A.-C. Physician Signature:   e-Signature: Dr. Shiva Tarango, Medical Director   Clinic Name / Location: [address]  [place] [address] Clinic Phone Number: Dept: [phone]   Appointment Time: 4:00 Pm Visit Start Time: 3:44 PM   Check-In Time:  3:34 Pm Visit Discharge Time:  4:26 PM     Original-Treating Physician or Chiropractor    Page 2-Insurer/TPA    Page 3-Employer    Page 4-Employee

## 2018-12-15 NOTE — PROGRESS NOTES
"Subjective:      Dorcas Dave is a 23 y.o. female who presents with Injury (DOI 11/27/18, left hand crushed 4th and 5th finger. still having some disomfort and there is still some bruising)      DOI: 11/27/18. Crush injury 5th digit left hand, with NEG xray. Improving. Still mild pain with certain movements and gripping. No radiation. Taking NSAIDS prn. On light duty, tolerating well. Denies new sxs.      HPI    ROS    Medications, Allergies, and current problem list reviewed today in Epic     Objective:     /82 (BP Location: Left arm, Patient Position: Sitting, BP Cuff Size: Large adult)   Pulse 65   Temp 36.3 °C (97.4 °F) (Temporal)   Resp 18   Ht 1.676 m (5' 6\")   Wt 109.3 kg (241 lb)   SpO2 98%   BMI 38.90 kg/m²      Physical Exam   Constitutional: She is oriented to person, place, and time. She appears well-developed and well-nourished. No distress.   Neurological: She is alert and oriented to person, place, and time.   Skin: Skin is warm and dry. She is not diaphoretic.   Psychiatric: She has a normal mood and affect. Her behavior is normal. Judgment and thought content normal.   Nursing note and vitals reviewed.      Mild tenderness MP joint of fifth digit left hand.  No bruising, swelling seen.  Range of motion normal.  Strength equal.  Distal neurovascular intact.         Assessment/Plan:     1. Contusion of left hand, subsequent encounter       Patient improving.  Work restrictions advance.  Likely full duty next week.    Please note that this dictation was created using voice recognition software. I have made every reasonable attempt to correct obvious errors, but I expect that there are errors of grammar and possibly content that I did not discover before finalizing the note.    "

## 2018-12-21 ENCOUNTER — OCCUPATIONAL MEDICINE (OUTPATIENT)
Dept: URGENT CARE | Facility: PHYSICIAN GROUP | Age: 23
End: 2018-12-21
Payer: COMMERCIAL

## 2018-12-21 VITALS
TEMPERATURE: 98 F | DIASTOLIC BLOOD PRESSURE: 66 MMHG | RESPIRATION RATE: 16 BRPM | OXYGEN SATURATION: 97 % | SYSTOLIC BLOOD PRESSURE: 128 MMHG | HEART RATE: 82 BPM | WEIGHT: 239 LBS | HEIGHT: 66 IN | BODY MASS INDEX: 38.41 KG/M2

## 2018-12-21 DIAGNOSIS — S60.052D CONTUSION OF LEFT LITTLE FINGER WITHOUT DAMAGE TO NAIL, SUBSEQUENT ENCOUNTER: ICD-10-CM

## 2018-12-21 PROCEDURE — 99213 OFFICE O/P EST LOW 20 MIN: CPT | Mod: 29 | Performed by: NURSE PRACTITIONER

## 2018-12-21 ASSESSMENT — PAIN SCALES - GENERAL: PAINLEVEL: NO PAIN

## 2018-12-21 NOTE — LETTER
[facility]  [address] [place] [address]. [address] - [place] [address]  Phone:  [phone] - Fax:  [phone]   [facility] Progress Report and Disability Certification  Date of Service: 12/21/2018   No Show:  No  Date / Time of Next Visit:  Discharged/MMI   Claim Information   Patient Name: Dorcas Dave  Claim Number:     Employer: [employer]  Date of Injury: 11/27/2018     Insurer / TPA: [employer]  ID / SSN:     Occupation:   Diagnosis: The encounter diagnosis was Contusion of left little finger without damage to nail, subsequent encounter.    Medical Information   Related to Industrial Injury? Yes    Subjective Complaints:  DOI 11/27/18. Patient states hand has improved. Has been sachin taping which has helped her ROM of fingers. Last worked 3 days ago, no NSAID use x 2 days. Has been using hand stress ball for ROM exercises. Will return to work tomorrow and requesting full duty without restrictions.    Objective Findings: A/O x 4, skin p/w/d, skin sensation intact. No TTP at of fifth MCP joint, no swelling, redness or bruising. FROM of left hand. Equal hand .    Pre-Existing Condition(s):     Assessment:   Condition Improved    Status: Discharged /  MMI  Permanent Disability:No    Plan:      Diagnostics:      Comments:       Disability Information   Status: Released to Full Duty    From:     Through:   Restrictions are:     Physical Restrictions   Sitting:    Standing:    Stooping:    Bending:      Squatting:    Walking:    Climbing:    Pushing:      Pulling:    Other:    Reaching Above Shoulder (L):   Reaching Above Shoulder (R):       Reaching Below Shoulder (L):    Reaching Below Shoulder (R):      Not to exceed Weight Limits   Carrying(hrs):   Weight Limit(lb):   Lifting(hrs):   Weight  Limit(lb):     Comments:      Repetitive Actions   Hands: i.e. Fine Manipulations from Grasping:     Feet: i.e. Operating Foot Controls:        Driving / Operate Machinery:     Physician Name: CHING Washburn Physician Signature: JAMEY Boss e-Signature: Dr. Shiva Tarango, Medical Director   Clinic Name / Location: [address]  [place] [address] Clinic Phone Number: Dept: [phone]   Appointment Time: 4:00 Pm Visit Start Time: 4:33 PM   Check-In Time:  3:31 Pm Visit Discharge Time: 5:08 PM   Original-Treating Physician or Chiropractor    Page 2-Insurer/TPA    Page 3-Employer    Page 4-Employee

## 2018-12-24 ASSESSMENT — ENCOUNTER SYMPTOMS
FEVER: 0
WEAKNESS: 0
CHILLS: 0

## 2018-12-24 NOTE — PROGRESS NOTES
Subjective:      Dorcas Dave is a 23 y.o. female who presents with Follow-Up (Work injury.  Able to move hand a lot better now.  Wearing brace at work but not so much at home.)      DOI 11/27/18. Patient states hand has improved. Has been buddy taping which has helped her ROM of fingers. Last worked 3 days ago, no NSAID use x 2 days. Has been using hand stress ball for ROM exercises. Will return to work tomorrow and requesting full duty without restrictions.      HPI  See above  PMH:  has a past medical history of Cold; IBS (irritable bowel syndrome); Infectious disease; Ovarian cyst (1 month ago); Pain; and Pneumonia (3/6/17).  MEDS:   Current Outpatient Prescriptions:   •  ondansetron (ZOFRAN ODT) 4 MG TABLET DISPERSIBLE, Take 1 Tab by mouth every 8 hours as needed., Disp: 10 Tab, Rfl: 0  •  diphenoxylate-atropine (LOMOTIL) 2.5-0.025 MG Tab, Take 1 Tab by mouth 4 times a day as needed for Diarrhea., Disp: 30 Tab, Rfl: 0  ALLERGIES:   Allergies   Allergen Reactions   • Nkda [No Known Drug Allergy]      SURGHX:   Past Surgical History:   Procedure Laterality Date   • ELFEGO BY LAPAROSCOPY  3/31/2017    Procedure: ELFEGO BY LAPAROSCOPY;  Surgeon: Srinivasa Valentine M.D.;  Location: SURGERY [facility];  Service:    • GASTROSCOPY  4/11/08    Performed by ERIC ELY at SURGERY SAME DAY [facility]     SOCHX:  reports that she is a non-smoker but has been exposed to tobacco smoke. She has never used smokeless tobacco. She reports that she drinks alcohol. She reports that she does not use drugs.  FH: Family history was reviewed, no pertinent findings to report    Review of Systems   Constitutional: Negative for chills, fever and malaise/fatigue.   Neurological: Negative for weakness.   All other systems reviewed and are negative.         Objective:     /66 (BP Location: Left arm, Patient Position: Sitting, BP Cuff Size: Adult)   Pulse 82   Temp 36.7 °C (98 °F) (Temporal)   Resp 16   Ht 1.676 m (5'  "6\")   Wt 108.4 kg (239 lb)   SpO2 97%   BMI 38.58 kg/m²      Physical Exam   Constitutional: Vital signs are normal. She appears well-developed and well-nourished. She is active and cooperative.  Non-toxic appearance. She does not have a sickly appearance. She does not appear ill. No distress.   Musculoskeletal:        Left hand: She exhibits normal range of motion, no tenderness, no bony tenderness, normal two-point discrimination, normal capillary refill, no deformity, no laceration and no swelling. Normal sensation noted. Normal strength noted.   Neurological: She is alert.   Skin: She is not diaphoretic.   Vitals reviewed.      A/O x 4, skin p/w/d, skin sensation intact. No TTP at of fifth MCP joint, no swelling, redness or bruising. FROM of left hand. Equal hand .        Assessment/Plan:     1. Contusion of left little finger without damage to nail, subsequent encounter    Discharged/MMI    "

## 2019-08-22 ENCOUNTER — HOSPITAL ENCOUNTER (OUTPATIENT)
Dept: LAB | Facility: MEDICAL CENTER | Age: 24
End: 2019-08-22
Attending: FAMILY MEDICINE
Payer: COMMERCIAL

## 2019-08-22 LAB
BASOPHILS # BLD AUTO: 0.8 % (ref 0–1.8)
BASOPHILS # BLD: 0.06 K/UL (ref 0–0.12)
EOSINOPHIL # BLD AUTO: 0.14 K/UL (ref 0–0.51)
EOSINOPHIL NFR BLD: 1.8 % (ref 0–6.9)
ERYTHROCYTE [DISTWIDTH] IN BLOOD BY AUTOMATED COUNT: 45.7 FL (ref 35.9–50)
FERRITIN SERPL-MCNC: 28.2 NG/ML (ref 10–291)
HCT VFR BLD AUTO: 43.9 % (ref 37–47)
HGB BLD-MCNC: 13.9 G/DL (ref 12–16)
IMM GRANULOCYTES # BLD AUTO: 0.02 K/UL (ref 0–0.11)
IMM GRANULOCYTES NFR BLD AUTO: 0.3 % (ref 0–0.9)
IRON SATN MFR SERPL: 25 % (ref 15–55)
IRON SERPL-MCNC: 100 UG/DL (ref 40–170)
LYMPHOCYTES # BLD AUTO: 2.69 K/UL (ref 1–4.8)
LYMPHOCYTES NFR BLD: 35 % (ref 22–41)
MCH RBC QN AUTO: 29 PG (ref 27–33)
MCHC RBC AUTO-ENTMCNC: 31.7 G/DL (ref 33.6–35)
MCV RBC AUTO: 91.6 FL (ref 81.4–97.8)
MONOCYTES # BLD AUTO: 0.39 K/UL (ref 0–0.85)
MONOCYTES NFR BLD AUTO: 5.1 % (ref 0–13.4)
NEUTROPHILS # BLD AUTO: 4.39 K/UL (ref 2–7.15)
NEUTROPHILS NFR BLD: 57 % (ref 44–72)
NRBC # BLD AUTO: 0 K/UL
NRBC BLD-RTO: 0 /100 WBC
PLATELET # BLD AUTO: 261 K/UL (ref 164–446)
PMV BLD AUTO: 9.6 FL (ref 9–12.9)
RBC # BLD AUTO: 4.79 M/UL (ref 4.2–5.4)
TIBC SERPL-MCNC: 402 UG/DL (ref 250–450)
WBC # BLD AUTO: 7.7 K/UL (ref 4.8–10.8)

## 2019-08-22 PROCEDURE — 83550 IRON BINDING TEST: CPT

## 2019-08-22 PROCEDURE — 83540 ASSAY OF IRON: CPT

## 2019-08-22 PROCEDURE — 82728 ASSAY OF FERRITIN: CPT

## 2019-08-22 PROCEDURE — 85025 COMPLETE CBC W/AUTO DIFF WBC: CPT

## 2019-08-22 PROCEDURE — 36415 COLL VENOUS BLD VENIPUNCTURE: CPT

## 2019-11-25 ENCOUNTER — HOSPITAL ENCOUNTER (OUTPATIENT)
Dept: HOSPITAL 8 - RAD | Age: 24
Discharge: HOME | End: 2019-11-25
Attending: FAMILY MEDICINE
Payer: COMMERCIAL

## 2019-11-25 DIAGNOSIS — M25.552: ICD-10-CM

## 2019-11-25 DIAGNOSIS — M54.5: Primary | ICD-10-CM

## 2019-11-25 PROCEDURE — 72110 X-RAY EXAM L-2 SPINE 4/>VWS: CPT

## 2020-01-28 ENCOUNTER — HOSPITAL ENCOUNTER (EMERGENCY)
Dept: HOSPITAL 8 - ED | Age: 25
Discharge: HOME | End: 2020-01-28
Payer: COMMERCIAL

## 2020-01-28 VITALS
BODY MASS INDEX: 41.45 KG/M2 | HEIGHT: 66 IN | DIASTOLIC BLOOD PRESSURE: 79 MMHG | SYSTOLIC BLOOD PRESSURE: 138 MMHG | WEIGHT: 257.94 LBS

## 2020-01-28 DIAGNOSIS — R11.0: ICD-10-CM

## 2020-01-28 DIAGNOSIS — N30.01: Primary | ICD-10-CM

## 2020-01-28 LAB
ALBUMIN SERPL-MCNC: 3.4 G/DL (ref 3.4–5)
ALP SERPL-CCNC: 65 U/L (ref 45–117)
ALT SERPL-CCNC: 31 U/L (ref 12–78)
ANION GAP SERPL CALC-SCNC: 6 MMOL/L (ref 5–15)
BASOPHILS # BLD AUTO: 0.04 X10^3/UL (ref 0–0.1)
BASOPHILS NFR BLD AUTO: 1 % (ref 0–1)
BILIRUB SERPL-MCNC: 0.3 MG/DL (ref 0.2–1)
CALCIUM SERPL-MCNC: 8.5 MG/DL (ref 8.5–10.1)
CHLORIDE SERPL-SCNC: 109 MMOL/L (ref 98–107)
CREAT SERPL-MCNC: 0.82 MG/DL (ref 0.55–1.02)
CULTURE INDICATED?: YES
EOSINOPHIL # BLD AUTO: 0.17 X10^3/UL (ref 0–0.4)
EOSINOPHIL NFR BLD AUTO: 2 % (ref 1–7)
ERYTHROCYTE [DISTWIDTH] IN BLOOD BY AUTOMATED COUNT: 13.5 % (ref 9.6–15.2)
LYMPHOCYTES # BLD AUTO: 2.74 X10^3/UL (ref 1–3.4)
LYMPHOCYTES NFR BLD AUTO: 35 % (ref 22–44)
MCH RBC QN AUTO: 29.7 PG (ref 27–34.8)
MCHC RBC AUTO-ENTMCNC: 33 G/DL (ref 32.4–35.8)
MCV RBC AUTO: 89.9 FL (ref 80–100)
MD: NO
MICROSCOPIC: (no result)
MONOCYTES # BLD AUTO: 0.57 X10^3/UL (ref 0.2–0.8)
MONOCYTES NFR BLD AUTO: 7 % (ref 2–9)
NEUTROPHILS # BLD AUTO: 4.4 X10^3/UL (ref 1.8–6.8)
NEUTROPHILS NFR BLD AUTO: 56 % (ref 42–75)
PLATELET # BLD AUTO: 306 X10^3/UL (ref 130–400)
PMV BLD AUTO: 7.4 FL (ref 7.4–10.4)
PROT SERPL-MCNC: 7.3 G/DL (ref 6.4–8.2)
RBC # BLD AUTO: 4.55 X10^6/UL (ref 3.82–5.3)

## 2020-01-28 PROCEDURE — 36415 COLL VENOUS BLD VENIPUNCTURE: CPT

## 2020-01-28 PROCEDURE — 84703 CHORIONIC GONADOTROPIN ASSAY: CPT

## 2020-01-28 PROCEDURE — 87086 URINE CULTURE/COLONY COUNT: CPT

## 2020-01-28 PROCEDURE — 81001 URINALYSIS AUTO W/SCOPE: CPT

## 2020-01-28 PROCEDURE — 85025 COMPLETE CBC W/AUTO DIFF WBC: CPT

## 2020-01-28 PROCEDURE — 74176 CT ABD & PELVIS W/O CONTRAST: CPT

## 2020-01-28 PROCEDURE — 80053 COMPREHEN METABOLIC PANEL: CPT

## 2020-01-28 PROCEDURE — 99284 EMERGENCY DEPT VISIT MOD MDM: CPT

## 2020-03-16 ENCOUNTER — OFFICE VISIT (OUTPATIENT)
Dept: URGENT CARE | Facility: CLINIC | Age: 25
End: 2020-03-16
Payer: COMMERCIAL

## 2020-03-16 VITALS
WEIGHT: 250 LBS | BODY MASS INDEX: 40.18 KG/M2 | TEMPERATURE: 97.5 F | HEART RATE: 88 BPM | OXYGEN SATURATION: 97 % | HEIGHT: 66 IN | DIASTOLIC BLOOD PRESSURE: 64 MMHG | SYSTOLIC BLOOD PRESSURE: 128 MMHG

## 2020-03-16 DIAGNOSIS — J06.9 VIRAL URI WITH COUGH: ICD-10-CM

## 2020-03-16 DIAGNOSIS — J02.9 SORE THROAT: ICD-10-CM

## 2020-03-16 LAB
FLUAV+FLUBV AG SPEC QL IA: NORMAL
INT CON NEG: NEGATIVE
INT CON NEG: NEGATIVE
INT CON POS: POSITIVE
INT CON POS: POSITIVE
S PYO AG THROAT QL: NORMAL

## 2020-03-16 PROCEDURE — 87880 STREP A ASSAY W/OPTIC: CPT | Performed by: PHYSICIAN ASSISTANT

## 2020-03-16 PROCEDURE — 87804 INFLUENZA ASSAY W/OPTIC: CPT | Performed by: PHYSICIAN ASSISTANT

## 2020-03-16 PROCEDURE — 99214 OFFICE O/P EST MOD 30 MIN: CPT | Performed by: PHYSICIAN ASSISTANT

## 2020-03-16 RX ORDER — BUPROPION HYDROCHLORIDE 150 MG/1
150 TABLET ORAL
COMMUNITY
Start: 2020-01-29 | End: 2020-03-16

## 2020-03-16 RX ORDER — CEFDINIR 300 MG/1
CAPSULE ORAL
COMMUNITY
Start: 2020-01-29 | End: 2020-03-16

## 2020-03-16 RX ORDER — BENZONATATE 200 MG/1
200 CAPSULE ORAL 3 TIMES DAILY PRN
Qty: 60 CAP | Refills: 0 | Status: SHIPPED | OUTPATIENT
Start: 2020-03-16 | End: 2022-10-24

## 2020-03-16 RX ORDER — FLUTICASONE PROPIONATE 50 MCG
1 SPRAY, SUSPENSION (ML) NASAL DAILY
Qty: 16 G | Refills: 0 | Status: SHIPPED | OUTPATIENT
Start: 2020-03-16 | End: 2022-10-24

## 2020-03-16 RX ORDER — DEXAMETHASONE SODIUM PHOSPHATE 4 MG/ML
8 INJECTION, SOLUTION INTRA-ARTICULAR; INTRALESIONAL; INTRAMUSCULAR; INTRAVENOUS; SOFT TISSUE ONCE
Status: COMPLETED | OUTPATIENT
Start: 2020-03-16 | End: 2020-03-16

## 2020-03-16 RX ORDER — PHENAZOPYRIDINE HYDROCHLORIDE 200 MG/1
TABLET, FILM COATED ORAL
COMMUNITY
Start: 2020-01-26 | End: 2020-03-16

## 2020-03-16 RX ORDER — GUAIFENESIN 600 MG/1
600 TABLET, EXTENDED RELEASE ORAL EVERY 12 HOURS
Qty: 28 TAB | Refills: 0 | Status: SHIPPED | OUTPATIENT
Start: 2020-03-16 | End: 2022-10-24

## 2020-03-16 RX ORDER — CIPROFLOXACIN 500 MG/1
500 TABLET, FILM COATED ORAL
COMMUNITY
Start: 2020-01-26 | End: 2020-03-16

## 2020-03-16 RX ADMIN — DEXAMETHASONE SODIUM PHOSPHATE 8 MG: 4 INJECTION, SOLUTION INTRA-ARTICULAR; INTRALESIONAL; INTRAMUSCULAR; INTRAVENOUS; SOFT TISSUE at 15:30

## 2020-03-16 ASSESSMENT — ENCOUNTER SYMPTOMS
SHORTNESS OF BREATH: 0
SWOLLEN GLANDS: 0
HOARSE VOICE: 1
TROUBLE SWALLOWING: 0
COUGH: 1

## 2020-03-16 NOTE — LETTER
March 16, 2020    To Whom It May Concern:         This is confirmation that Dorcas Dave attended her scheduled appointment with Vern Louise P.A.-C. on 3/16/20. She may return to work on 3/19/20.         If you have any questions please do not hesitate to call me at the phone number listed below.    Sincerely,          Vern Louise P.A.-C.  [phone]

## 2020-03-16 NOTE — PROGRESS NOTES
Subjective:   Dorcas Dave is a 25 y.o. female who presents for Cough (x2 days. Cough, congestion, sore throat.)        No recent travel.  No known exposure to COVID-19. Thinks a coworker could have been exposed.    Pharyngitis    This is a new problem. The current episode started yesterday. The problem has been gradually worsening. Neither side of throat is experiencing more pain than the other. There has been no fever. The pain is at a severity of 8/10. The pain is severe. Associated symptoms include coughing (dry ) and a hoarse voice. Pertinent negatives include no congestion, ear pain, plugged ear sensation, shortness of breath, swollen glands or trouble swallowing. Associated symptoms comments: Pain with swallowing, headaches. She has had no exposure to strep or mono. She has tried nothing for the symptoms. The treatment provided no relief.     Review of Systems   HENT: Positive for hoarse voice. Negative for congestion, ear pain and trouble swallowing.    Respiratory: Positive for cough (dry ). Negative for shortness of breath.        PMH:  has a past medical history of Cold, IBS (irritable bowel syndrome), Infectious disease, Ovarian cyst (1 month ago), Pain, and Pneumonia (3/6/17).  MEDS:   Current Outpatient Medications:   •  benzonatate (TESSALON) 200 MG capsule, Take 1 Cap by mouth 3 times a day as needed for Cough., Disp: 60 Cap, Rfl: 0  •  fluticasone (FLONASE) 50 MCG/ACT nasal spray, Spray 1 Spray in nose every day., Disp: 16 g, Rfl: 0  •  guaiFENesin ER (MUCINEX) 600 MG TABLET SR 12 HR, Take 1 Tab by mouth every 12 hours., Disp: 28 Tab, Rfl: 0  ALLERGIES: No Known Allergies  SURGHX:   Past Surgical History:   Procedure Laterality Date   • ELFEGO BY LAPAROSCOPY  3/31/2017    Procedure: ELFEGO BY LAPAROSCOPY;  Surgeon: Srinivasa Valentine M.D.;  Location: SURGERY [facility];  Service:    • GASTROSCOPY  4/11/08    Performed by ERIC ELY at SURGERY SAME DAY [facility]     SOCHX:  reports that  "she is a non-smoker but has been exposed to tobacco smoke. She has never used smokeless tobacco. She reports current alcohol use. She reports that she does not use drugs.  FH: Family history was reviewed, no pertinent findings to report   Objective:   /64   Pulse 88   Temp 36.4 °C (97.5 °F)   Ht 1.676 m (5' 6\")   Wt 113.4 kg (250 lb)   LMP 03/02/2020   SpO2 97%   BMI 40.35 kg/m²   Physical Exam  Vitals signs reviewed.   Constitutional:       General: She is not in acute distress.     Appearance: Normal appearance. She is well-developed. She is not toxic-appearing.   HENT:      Head: Normocephalic and atraumatic.      Right Ear: Tympanic membrane, ear canal and external ear normal.      Left Ear: Tympanic membrane, ear canal and external ear normal.      Nose: Mucosal edema and rhinorrhea present. No congestion.      Mouth/Throat:      Mouth: Mucous membranes are moist.      Pharynx: Oropharynx is clear. Uvula midline.      Tonsils: No tonsillar exudate. 2+ on the right. 2+ on the left.   Eyes:      General: Lids are normal.      Conjunctiva/sclera: Conjunctivae normal.   Neck:      Musculoskeletal: Neck supple.   Cardiovascular:      Rate and Rhythm: Normal rate and regular rhythm.      Heart sounds: Normal heart sounds, S1 normal and S2 normal. No murmur. No friction rub. No gallop.    Pulmonary:      Effort: Pulmonary effort is normal. No respiratory distress.      Breath sounds: Normal breath sounds. No decreased breath sounds, wheezing, rhonchi or rales.   Lymphadenopathy:      Cervical: Cervical adenopathy present.      Right cervical: Superficial cervical adenopathy present. No posterior cervical adenopathy.     Left cervical: Superficial cervical adenopathy present. No posterior cervical adenopathy.   Skin:     General: Skin is warm and dry.      Capillary Refill: Capillary refill takes less than 2 seconds.   Neurological:      Mental Status: She is alert and oriented to person, place, and time. "      Cranial Nerves: No cranial nerve deficit.      Sensory: No sensory deficit.   Psychiatric:         Speech: Speech normal.         Behavior: Behavior normal.         Thought Content: Thought content normal.         Judgment: Judgment normal.           Assessment/Plan:   1. Viral URI with cough  - benzonatate (TESSALON) 200 MG capsule; Take 1 Cap by mouth 3 times a day as needed for Cough.  Dispense: 60 Cap; Refill: 0  - fluticasone (FLONASE) 50 MCG/ACT nasal spray; Spray 1 Spray in nose every day.  Dispense: 16 g; Refill: 0  - guaiFENesin ER (MUCINEX) 600 MG TABLET SR 12 HR; Take 1 Tab by mouth every 12 hours.  Dispense: 28 Tab; Refill: 0    2. Sore throat  - dexamethasone (DECADRON) injection 8 mg  - POCT Influenza A/B  - POCT Rapid Strep A    Results for orders placed or performed in visit on 03/16/20   POCT Influenza A/B   Result Value Ref Range    Rapid Influenza A-B Neg     Internal Control Positive Positive     Internal Control Negative Negative    POCT Rapid Strep A   Result Value Ref Range    Rapid Strep Screen Neg     Internal Control Positive Positive     Internal Control Negative Negative      VSS, no dyspnea, no SOB, and lungs CTA on PE.  Consistent with viral URI without lower respiratory involvement. Goals of care include symptomatic control and prevention of lower respiratory spread. Signs of lower respiratory involvement discussed with pt.  Pt instructed to RTC if any of these are observed.     Drink plenty of fluids and rest.  Flonase 1 squirt in each nostril, as instructed in clinic, once a day.  Use nasal saline TID to promote drainage.   Salt water gurgles to soothe sore throat.  Start OTC expectorant.  APAP for fever control, and ibuprofen for throat pain/headache relief prn.    Try to use sudafed sparingly in order to allow sinuses to drain.  Avoid the longer formulations and try to take only in the morning and/or at noon if needed.  If you fail to improve in 2-4 days or symptoms worsen/new  symptoms develop, RTC for reevaluation.    Differential diagnosis, natural history, supportive care, and indications for immediate follow-up discussed.

## 2020-11-12 ENCOUNTER — HOSPITAL ENCOUNTER (OUTPATIENT)
Dept: HOSPITAL 8 - RAD | Age: 25
Discharge: HOME | End: 2020-11-12
Attending: FAMILY MEDICINE
Payer: COMMERCIAL

## 2020-11-12 DIAGNOSIS — M25.562: Primary | ICD-10-CM

## 2021-04-30 ENCOUNTER — HOSPITAL ENCOUNTER (OUTPATIENT)
Dept: HOSPITAL 8 - CFH | Age: 26
Discharge: HOME | End: 2021-04-30
Attending: FAMILY MEDICINE
Payer: COMMERCIAL
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
## 2021-04-30 DIAGNOSIS — S83.282A: Primary | ICD-10-CM

## 2021-04-30 DIAGNOSIS — M23.342: ICD-10-CM
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
## 2021-04-30 DIAGNOSIS — M23.042: ICD-10-CM

## 2021-04-30 DIAGNOSIS — N64.4: ICD-10-CM
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
## 2021-04-30 PROCEDURE — 76642 ULTRASOUND BREAST LIMITED: CPT

## 2022-10-24 ENCOUNTER — OFFICE VISIT (OUTPATIENT)
Dept: URGENT CARE | Facility: PHYSICIAN GROUP | Age: 27
End: 2022-10-24
Payer: COMMERCIAL

## 2022-10-24 VITALS
BODY MASS INDEX: 44.68 KG/M2 | RESPIRATION RATE: 18 BRPM | SYSTOLIC BLOOD PRESSURE: 98 MMHG | OXYGEN SATURATION: 95 % | HEIGHT: 66 IN | HEART RATE: 108 BPM | WEIGHT: 278 LBS | TEMPERATURE: 99.8 F | DIASTOLIC BLOOD PRESSURE: 70 MMHG

## 2022-10-24 DIAGNOSIS — U07.1 COVID: ICD-10-CM

## 2022-10-24 DIAGNOSIS — J02.9 SORE THROAT: ICD-10-CM

## 2022-10-24 DIAGNOSIS — E66.01 MORBID OBESITY (HCC): ICD-10-CM

## 2022-10-24 LAB
EXTERNAL QUALITY CONTROL: ABNORMAL
INT CON NEG: ABNORMAL
INT CON NEG: NORMAL
INT CON POS: ABNORMAL
INT CON POS: NORMAL
S PYO AG THROAT QL: NORMAL
SARS-COV+SARS-COV-2 AG RESP QL IA.RAPID: POSITIVE

## 2022-10-24 PROCEDURE — 87880 STREP A ASSAY W/OPTIC: CPT | Performed by: NURSE PRACTITIONER

## 2022-10-24 PROCEDURE — 87426 SARSCOV CORONAVIRUS AG IA: CPT | Performed by: NURSE PRACTITIONER

## 2022-10-24 PROCEDURE — 99214 OFFICE O/P EST MOD 30 MIN: CPT | Mod: CS | Performed by: NURSE PRACTITIONER

## 2022-10-24 NOTE — LETTER
October 24, 2022         Patient: Dorcas Dave   YOB: 1995   Date of Visit: 10/24/2022           To Whom it May Concern:    Dorcas Dave was seen in my clinic on 10/24/2022. A concern for Covid-19 has been identified and subsequent testing was positive for covid.  You will be able to access test results through our electronic delivery system called [employer]. We are asking you practice self-isolation protocol per Center for Disease Control (CDC) guidelines.  In general, this is a minimum of 5 days from the onset of symptoms, after which time you are cleared to end isolation and return to work as long as symptoms are improving and you are without fever, vomiting, or diarrhea.    In general, repeat testing is not necessary and not offered through our [facility].    This is the only note that will be provided from [facility] for this visit.  Your employee will require an appointment with a primary care provider if FMLA or disability forms are required.          Sincerely,           LUIS MIGUEL Moctezuma  Electronically Signed

## 2022-10-25 ASSESSMENT — ENCOUNTER SYMPTOMS
SORE THROAT: 1
SPUTUM PRODUCTION: 0
FEVER: 1
COUGH: 1
WHEEZING: 0
HEADACHES: 1
MYALGIAS: 1
CHILLS: 0
SHORTNESS OF BREATH: 0
HEMOPTYSIS: 0

## 2022-10-25 NOTE — PROGRESS NOTES
"Subjective     Dorcas Dave is a 27 y.o. female who presents with Fever (Since yesterday), Sore Throat, Headache, and Pain (Eye pain)            Dorcas comes in today with a 1 day history of fever, sore throat, headache, and pain behind her eyes.  She has slight nasal congestion and intermittent cough.  No chest pain or shortness of breath.  Not taking any meds.  No known exposure to covid.  Not vaccinated against covid.  Morbid obesity as risk for severe covid.  No known renal or liver disease.        Review of Systems   Constitutional:  Positive for fever and malaise/fatigue. Negative for chills.   HENT:  Positive for congestion and sore throat. Negative for ear pain.    Respiratory:  Positive for cough. Negative for hemoptysis, sputum production, shortness of breath and wheezing.    Cardiovascular:  Negative for chest pain.   Musculoskeletal:  Positive for myalgias.   Neurological:  Positive for headaches.      Medications, Allergies, and current problem list reviewed today in Epic      Objective     Blood Pressure (Abnormal) 98/70 (BP Location: Left arm)   Pulse (Abnormal) 108   Temperature 37.7 °C (99.8 °F) (Temporal)   Respiration 18   Height 1.676 m (5' 6\")   Weight (Abnormal) 126 kg (278 lb)   Oxygen Saturation 95%   Body Mass Index 44.87 kg/m²      Physical Exam  Vitals reviewed.   Constitutional:       General: She is not in acute distress.     Appearance: She is well-developed. She is not ill-appearing, toxic-appearing or diaphoretic.      Comments: Fatigued.   HENT:      Head: Normocephalic.      Right Ear: Tympanic membrane, ear canal and external ear normal. There is no impacted cerumen.      Left Ear: Tympanic membrane, ear canal and external ear normal. There is no impacted cerumen.      Nose: Rhinorrhea present. No congestion.      Mouth/Throat:      Mouth: Mucous membranes are moist.      Pharynx: Posterior oropharyngeal erythema present. No oropharyngeal exudate.   Eyes:      " General: No scleral icterus.        Right eye: No discharge.         Left eye: No discharge.      Conjunctiva/sclera: Conjunctivae normal.   Neck:      Thyroid: No thyromegaly.      Vascular: No JVD.      Trachea: No tracheal deviation.   Cardiovascular:      Rate and Rhythm: Regular rhythm. Tachycardia present.      Heart sounds: Normal heart sounds. No murmur heard.    No friction rub. No gallop.   Pulmonary:      Effort: Pulmonary effort is normal. No respiratory distress.      Breath sounds: Normal breath sounds. No stridor. No wheezing, rhonchi or rales.   Chest:      Chest wall: No tenderness.   Musculoskeletal:      Cervical back: Neck supple. No tenderness.   Lymphadenopathy:      Cervical: Cervical adenopathy present.   Skin:     General: Skin is warm and dry.      Coloration: Skin is not jaundiced or pale.   Neurological:      Mental Status: She is alert and oriented to person, place, and time.              POCT covid antigen: positive  POCT rapid strep a: negative             Assessment & Plan        1. COVID  Nirmatrelvir&Ritonavir 300/100 20 x 150 MG & 10 x 100MG Tablet Therapy Pack      2. Sore throat  POCT Rapid Strep A    POCT SARS-COV Antigen JOLANTA (Symptomatic only)      3. Morbid obesity (HCC)            1. COVID    - Nirmatrelvir&Ritonavir 300/100 20 x 150 MG & 10 x 100MG Tablet Therapy Pack; Take 300 mg nirmatrelvir (two 150 mg tablets) with 100 mg ritonavir (one 100 mg tablet) by mouth, with all three tablets taken together twice daily for 5 days.  Dispense: 30 Each; Refill: 0    2. Sore throat    - POCT Rapid Strep A  - POCT SARS-COV Antigen JOLANTA (Symptomatic only)    3. Morbid obesity (HCC)    Advised Dorcas that based on the history and exam findings, this is covid, a viral illness.  There is no indication for antibiotics at this time.  Differential and secondary risks/complitations reviewed.  At home isolation and return to work guidelines reviewed.  Discussed Paxlovid and monoclonal antibody  therapy at length, including EUA status and that all possible risks and benefits are not known at this time.  Alternatives reviewed.  Paxlovid information sheet for patients parents and caregivers provided.  She wishes to proceed with paxlovid as this time.  Paxlovid as prescribed.   OTC cold medications prn symptom management.  OTC NSAIDs or tylenol prn fever, pain.    Maintain adequate po hydration.  RTC in 7-10 days if symptoms persist, sooner if worse.  ED precautions reviewed.  She verbalized understanding of and agreed with plan of care.

## 2023-09-05 ENCOUNTER — OFFICE VISIT (OUTPATIENT)
Dept: URGENT CARE | Facility: PHYSICIAN GROUP | Age: 28
End: 2023-09-05
Payer: COMMERCIAL

## 2023-09-05 VITALS
WEIGHT: 260 LBS | BODY MASS INDEX: 41.78 KG/M2 | OXYGEN SATURATION: 97 % | SYSTOLIC BLOOD PRESSURE: 110 MMHG | RESPIRATION RATE: 20 BRPM | HEART RATE: 72 BPM | HEIGHT: 66 IN | TEMPERATURE: 97.2 F | DIASTOLIC BLOOD PRESSURE: 80 MMHG

## 2023-09-05 DIAGNOSIS — H66.92 ACUTE BACTERIAL INFECTION OF LEFT MIDDLE EAR: ICD-10-CM

## 2023-09-05 DIAGNOSIS — J01.40 ACUTE NON-RECURRENT PANSINUSITIS: ICD-10-CM

## 2023-09-05 PROCEDURE — 99213 OFFICE O/P EST LOW 20 MIN: CPT | Performed by: NURSE PRACTITIONER

## 2023-09-05 PROCEDURE — 3079F DIAST BP 80-89 MM HG: CPT | Performed by: NURSE PRACTITIONER

## 2023-09-05 PROCEDURE — 3074F SYST BP LT 130 MM HG: CPT | Performed by: NURSE PRACTITIONER

## 2023-09-05 RX ORDER — AMOXICILLIN AND CLAVULANATE POTASSIUM 875; 125 MG/1; MG/1
1 TABLET, FILM COATED ORAL 2 TIMES DAILY
Qty: 20 TABLET | Refills: 0 | Status: SHIPPED | OUTPATIENT
Start: 2023-09-05 | End: 2023-09-15

## 2023-09-05 ASSESSMENT — ENCOUNTER SYMPTOMS
ABDOMINAL PAIN: 0
SPUTUM PRODUCTION: 0
DIARRHEA: 0
HEADACHES: 1
MYALGIAS: 0
SHORTNESS OF BREATH: 1
FEVER: 0
SINUS PAIN: 1
COUGH: 1
VOMITING: 0
SORE THROAT: 1
NAUSEA: 0
WHEEZING: 0
CHILLS: 1

## 2023-09-05 NOTE — LETTER
[facility] / [facility] [place]  [address]  [facility] [id number]     September 5, 2023    Patient: Dorcas Dave   YOB: 1995   Date of Visit: 9/5/2023       To Whom It May Concern:    Dorcas Dave was seen and treated in our department on 9/5/2023.  Will need to be excused from work due to illness and may return on 9/7/2023 as long as she is fever free.    Sincerely,     LEATHA Duncan.

## 2023-09-13 NOTE — PATIENT INSTRUCTIONS
Imodium 4mg at once, then 2mg after each loose stool. Max of 8mg a day.  Try a bowel reset diet:  Clear liquids for 24 hours  Full liquids for next 24 hrs  BRAT diet after that for 2-3 days  B Banana  R Rice  A Applesauce  T Toast     Oriented - self; Oriented - place; Oriented - time

## 2024-05-09 ENCOUNTER — OFFICE VISIT (OUTPATIENT)
Dept: URGENT CARE | Facility: PHYSICIAN GROUP | Age: 29
End: 2024-05-09
Payer: COMMERCIAL

## 2024-05-09 VITALS
TEMPERATURE: 98.4 F | WEIGHT: 250 LBS | DIASTOLIC BLOOD PRESSURE: 72 MMHG | RESPIRATION RATE: 14 BRPM | BODY MASS INDEX: 40.18 KG/M2 | HEIGHT: 66 IN | OXYGEN SATURATION: 97 % | SYSTOLIC BLOOD PRESSURE: 122 MMHG | HEART RATE: 98 BPM

## 2024-05-09 DIAGNOSIS — H10.9 BACTERIAL CONJUNCTIVITIS OF RIGHT EYE: ICD-10-CM

## 2024-05-09 PROCEDURE — 99213 OFFICE O/P EST LOW 20 MIN: CPT | Performed by: NURSE PRACTITIONER

## 2024-05-09 PROCEDURE — 3078F DIAST BP <80 MM HG: CPT | Performed by: NURSE PRACTITIONER

## 2024-05-09 PROCEDURE — 3074F SYST BP LT 130 MM HG: CPT | Performed by: NURSE PRACTITIONER

## 2024-05-09 RX ORDER — POLYMYXIN B SULFATE AND TRIMETHOPRIM 1; 10000 MG/ML; [USP'U]/ML
1 SOLUTION OPHTHALMIC EVERY 4 HOURS
Qty: 4 ML | Refills: 0 | Status: SHIPPED | OUTPATIENT
Start: 2024-05-09 | End: 2024-05-19

## 2024-05-09 ASSESSMENT — VISUAL ACUITY: OU: 1

## 2024-05-09 NOTE — PROGRESS NOTES
"Subjective:   Dorcas Dave is a 29 y.o. female who presents for Eye Problem (X2-3 days R eye, drainage, goopy, itching burning, watery )    Patient is a 29-year-old female presents clinic today reporting 3-day history of worsening right eye redness, burning sensation, itching, initially watery eye which is now turned to thick yellow discharge and matting and crusting in the morning.  Patient denies any symptoms in her left eye.  She has not had any vision changes, headaches, or fevers.  Patient does wear contact lenses however has not worn them in 3 days and has been wearing glasses.  Patient denies any injury, trauma, or foreign body    Medications, Allergies, and current problem list reviewed today in Epic.     Objective:     /72   Pulse 98   Temp 36.9 °C (98.4 °F) (Temporal)   Resp 14   Ht 1.676 m (5' 6\")   Wt 113 kg (250 lb)   SpO2 97%     Physical Exam  Vitals reviewed.   Constitutional:       Appearance: Normal appearance.   HENT:      Head: Normocephalic.      Nose: Nose normal.      Mouth/Throat:      Mouth: Mucous membranes are moist.   Eyes:      General: Lids are normal. Vision grossly intact. Gaze aligned appropriately.      Extraocular Movements: Extraocular movements intact.      Conjunctiva/sclera:      Right eye: Right conjunctiva is injected. Exudate present. No chemosis.     Left eye: Left conjunctiva is not injected. No chemosis or exudate.     Pupils: Pupils are equal, round, and reactive to light.   Cardiovascular:      Rate and Rhythm: Normal rate.   Pulmonary:      Effort: Pulmonary effort is normal.   Musculoskeletal:         General: Normal range of motion.      Cervical back: Normal range of motion and neck supple.   Skin:     General: Skin is warm and dry.   Neurological:      Mental Status: She is alert and oriented to person, place, and time.   Psychiatric:         Mood and Affect: Mood normal.         Behavior: Behavior normal.         Thought Content: Thought content " normal.         Judgment: Judgment normal.         Assessment/Plan:     Diagnosis and associated orders:     1. Bacterial conjunctivitis of right eye  polymixin-trimethoprim (POLYTRIM) 20219-8.1 UNIT/ML-% Solution         Comments/MDM:     HPI and physical exam findings are consistent with bacterial conjunctivitis.  Antibiotic eyedrops prescribed.  Appropriate use demonstration was discussed.  May use over-the-counter baby shampoo and warm washcloth to help with matting.  OTC Tylenol or Motrin for fever/discomfort.  Avoid touching eyes  Hand Hygiene   Follow-up with PCP  AVS printed  Return to clinic or go to the ED if symptoms worsen or fail to improve, or if patient should develop worsening/increasing/persistent eye redness, eye drainage, eye pain, eye itchiness, vision changes, periorbital redness or swelling, headache, fever/chills, and/or any concerning symptoms.    Patient was involved with shared decision-making throughout the exam today and verbalizes understanding regards to plan of care, discharge instructions, and follow-up         Differential diagnosis, natural history, supportive care, and indications for immediate follow-up discussed.    Advised the patient to follow-up with the primary care physician for recheck, reevaluation, and consideration of further management.    I personally reviewed prior external notes and test results pertinent to today's visit as well as additional imaging and testing completed in clinic today.     Please note that this dictation was created using voice recognition software. I have made a reasonable attempt to correct obvious errors, but I expect that there are errors of grammar and possibly content that I did not discover before finalizing the note.

## (undated) DEVICE — LACTATED RINGERS INJ 1000 ML - (14EA/CA 60CA/PF)

## (undated) DEVICE — TUBE CONNECT SUCTION CLEAR 120 X 1/4" (50EA/CA)"

## (undated) DEVICE — HEAD HOLDER JUNIOR/ADULT

## (undated) DEVICE — SET SUCTION/IRRIGATION WITH DISPOSABLE TIP (6/CA )PART #0250-070-520 IS A SUB

## (undated) DEVICE — MASK ANESTHESIA ADULT  - (100/CA)

## (undated) DEVICE — PROTECTOR ULNA NERVE - (36PR/CA)

## (undated) DEVICE — GLOVE BIOGEL PI ORTHO SZ 8 PF LF (40PR/BX)

## (undated) DEVICE — SUTURE 0 VICRYL PLUS UR-6 - 27 INCH (36/BX)

## (undated) DEVICE — SET EXTENSION WITH 2 PORTS (48EA/CA) ***PART #2C8610 IS A SUBSTITUTE*****

## (undated) DEVICE — BLADE SURGICAL CLIPPER - (50EA/CA)

## (undated) DEVICE — BAG RETRIEVAL 10ML (10EA/BX)

## (undated) DEVICE — TROCAR Z THREAD11MM OPTICAL - NON BLADED(6/BX)

## (undated) DEVICE — CLIP MED LG INTNL HRZN TI ESCP - (20/BX)

## (undated) DEVICE — SUTURE 4-0 MONOCRYL PLUS PS-2 - 27 INCH (36/BX)

## (undated) DEVICE — SET LEADWIRE 5 LEAD BEDSIDE DISPOSABLE ECG (1SET OF 5/EA)

## (undated) DEVICE — KIT ANESTHESIA W/CIRCUIT & 3/LT BAG W/FILTER (20EA/CA)

## (undated) DEVICE — SUTURE GENERAL

## (undated) DEVICE — LEAD SET 6 DISP. EKG NIHON KOHDEN (100EA/CA) [9859].

## (undated) DEVICE — ELECTRODE 850 FOAM ADHESIVE - HYDROGEL RADIOTRNSPRNT (50/PK)

## (undated) DEVICE — SODIUM CHL IRRIGATION 0.9% 1000ML (12EA/CA)

## (undated) DEVICE — ELECTRODE DUAL RETURN W/ CORD - (50/PK)

## (undated) DEVICE — TUBING INSUFFLATION - (10/BX)

## (undated) DEVICE — GLOVE BIOGEL PI INDICATOR SZ 7.0 SURGICAL PF LF - (50/BX 4BX/CA)

## (undated) DEVICE — GOWN WARMING STANDARD FLEX - (30/CA)

## (undated) DEVICE — TUBE E-T HI-LO CUFF 7.0MM (10EA/PK)

## (undated) DEVICE — CANISTER SUCTION 3000ML MECHANICAL FILTER AUTO SHUTOFF MEDI-VAC NONSTERILE LF DISP  (40EA/CA)

## (undated) DEVICE — CANNULA W/SEAL 5X100 Z-THRE - ADED KII (12/BX)

## (undated) DEVICE — KIT ROOM DECONTAMINATION

## (undated) DEVICE — GLOVE BIOGEL SZ 8.5 SURGICAL PF LTX - (50PR/BX 4BX/CA)

## (undated) DEVICE — GOWN SURGEONS X-LARGE - DISP. (30/CA)

## (undated) DEVICE — SUCTION INSTRUMENT YANKAUER BULBOUS TIP W/O VENT (50EA/CA)

## (undated) DEVICE — DETERGENT RENUZYME PLUS 10 OZ PACKET (50/BX)

## (undated) DEVICE — BANDAID SHEER STRIP 3/4 IN (100EA/BX 12BX/CA)

## (undated) DEVICE — TUBING CLEARLINK DUO-VENT - C-FLO (48EA/CA)

## (undated) DEVICE — TROCAR 5X100 NON BLADED Z-TH - READ KII (6/BX)

## (undated) DEVICE — NEPTUNE 4 PORT MANIFOLD - (20/PK)

## (undated) DEVICE — CHLORAPREP 26 ML APPLICATOR - ORANGE TINT(25/CA)

## (undated) DEVICE — NEEDLE INSFL 120MM 14GA VRRS - (20/BX)

## (undated) DEVICE — SENSOR SPO2 NEO LNCS ADHESIVE (20/BX) SEE USER NOTES

## (undated) DEVICE — CLOSURE SKIN STRIP 1/2 X 4 IN - (STERI STRIP) (50/BX 4BX/CA)